# Patient Record
Sex: FEMALE | Race: WHITE | NOT HISPANIC OR LATINO | Employment: OTHER | URBAN - NONMETROPOLITAN AREA
[De-identification: names, ages, dates, MRNs, and addresses within clinical notes are randomized per-mention and may not be internally consistent; named-entity substitution may affect disease eponyms.]

---

## 2017-04-14 ENCOUNTER — DOCTOR'S OFFICE (OUTPATIENT)
Dept: URBAN - NONMETROPOLITAN AREA CLINIC 1 | Facility: CLINIC | Age: 67
Setting detail: OPHTHALMOLOGY
End: 2017-04-14
Payer: COMMERCIAL

## 2017-04-14 DIAGNOSIS — H35.373: ICD-10-CM

## 2017-04-14 DIAGNOSIS — H40.053: ICD-10-CM

## 2017-04-14 DIAGNOSIS — Z96.1: ICD-10-CM

## 2017-04-14 DIAGNOSIS — H35.3131: ICD-10-CM

## 2017-04-14 DIAGNOSIS — H43.813: ICD-10-CM

## 2017-04-14 PROCEDURE — 92014 COMPRE OPH EXAM EST PT 1/>: CPT | Performed by: OPHTHALMOLOGY

## 2017-04-14 PROCEDURE — 92134 CPTRZ OPH DX IMG PST SGM RTA: CPT | Performed by: OPHTHALMOLOGY

## 2017-04-14 ASSESSMENT — REFRACTION_CURRENTRX
OS_OVR_VA: 20/
OS_AXIS: 62
OD_OVR_VA: 20/
OD_SPHERE: +1.00
OD_OVR_VA: 20/
OS_OVR_VA: 20/
OD_AXIS: 68
OS_CYLINDER: -0.75
OD_CYLINDER: -2.00
OS_OVR_VA: 20/
OD_OVR_VA: 20/
OS_SPHERE: +0.25

## 2017-04-14 ASSESSMENT — CONFRONTATIONAL VISUAL FIELD TEST (CVF)
OS_FINDINGS: FULL
OD_FINDINGS: FULL

## 2017-04-14 ASSESSMENT — REFRACTION_MANIFEST
OD_VA2: 20/
OS_VA1: 20/
OD_VA1: 20/
OD_VA1: 20/
OD_VA3: 20/
OS_VA2: 20/
OU_VA: 20/
OD_VA2: 20/
OU_VA: 20/
OD_VA1: 20/
OS_VA2: 20/
OS_VA1: 20/
OS_VA3: 20/
OS_VA2: 20/
OD_VA3: 20/
OS_VA1: 20/
OD_VA2: 20/
OU_VA: 20/
OD_VA3: 20/
OS_VA3: 20/
OS_VA3: 20/

## 2017-04-14 ASSESSMENT — REFRACTION_AUTOREFRACTION
OD_SPHERE: PL
OD_CYLINDER: -0.25
OS_SPHERE: +1.00
OS_AXIS: 097
OD_AXIS: 127
OS_CYLINDER: -1.25

## 2017-04-14 ASSESSMENT — SPHEQUIV_DERIVED: OS_SPHEQUIV: 0.375

## 2017-04-14 ASSESSMENT — VISUAL ACUITY
OS_BCVA: 20/20-1
OD_BCVA: 20/20-2

## 2019-07-18 ENCOUNTER — DOCTOR'S OFFICE (OUTPATIENT)
Dept: URBAN - NONMETROPOLITAN AREA CLINIC 1 | Facility: CLINIC | Age: 69
Setting detail: OPHTHALMOLOGY
End: 2019-07-18
Payer: COMMERCIAL

## 2019-07-18 DIAGNOSIS — H43.813: ICD-10-CM

## 2019-07-18 DIAGNOSIS — Z96.1: ICD-10-CM

## 2019-07-18 DIAGNOSIS — H35.3131: ICD-10-CM

## 2019-07-18 DIAGNOSIS — H35.373: ICD-10-CM

## 2019-07-18 PROCEDURE — 92134 CPTRZ OPH DX IMG PST SGM RTA: CPT | Performed by: OPHTHALMOLOGY

## 2019-07-18 PROCEDURE — 92014 COMPRE OPH EXAM EST PT 1/>: CPT | Performed by: OPHTHALMOLOGY

## 2019-07-18 ASSESSMENT — REFRACTION_CURRENTRX
OS_CYLINDER: -0.75
OD_OVR_VA: 20/
OD_OVR_VA: 20/
OS_AXIS: 62
OS_OVR_VA: 20/
OS_OVR_VA: 20/
OS_SPHERE: +0.25
OD_CYLINDER: -2.00
OD_SPHERE: +1.00
OD_OVR_VA: 20/
OD_AXIS: 68
OS_OVR_VA: 20/

## 2019-07-18 ASSESSMENT — REFRACTION_MANIFEST
OS_VA2: 20/
OD_VA2: 20/
OD_VA2: 20/
OD_VA3: 20/
OU_VA: 20/
OS_VA1: 20/
OS_VA3: 20/
OS_VA2: 20/
OU_VA: 20/
OD_VA1: 20/
OS_VA3: 20/
OD_VA1: 20/
OS_VA1: 20/
OD_VA3: 20/

## 2019-07-18 ASSESSMENT — CONFRONTATIONAL VISUAL FIELD TEST (CVF)
OS_FINDINGS: FULL
OD_FINDINGS: FULL

## 2019-07-18 ASSESSMENT — SPHEQUIV_DERIVED: OS_SPHEQUIV: 0.375

## 2019-07-18 ASSESSMENT — REFRACTION_AUTOREFRACTION
OS_CYLINDER: -1.25
OD_SPHERE: PL
OD_CYLINDER: -0.25
OS_SPHERE: +1.00
OS_AXIS: 097
OD_AXIS: 127

## 2019-07-18 ASSESSMENT — VISUAL ACUITY
OD_BCVA: 20/20-1
OS_BCVA: 20/25

## 2019-08-05 ENCOUNTER — DOCTOR'S OFFICE (OUTPATIENT)
Dept: URBAN - NONMETROPOLITAN AREA CLINIC 1 | Facility: CLINIC | Age: 69
Setting detail: OPHTHALMOLOGY
End: 2019-08-05
Payer: COMMERCIAL

## 2019-08-05 DIAGNOSIS — H35.3131: ICD-10-CM

## 2019-08-05 DIAGNOSIS — H43.813: ICD-10-CM

## 2019-08-05 DIAGNOSIS — H04.123: ICD-10-CM

## 2019-08-05 DIAGNOSIS — H35.373: ICD-10-CM

## 2019-08-05 DIAGNOSIS — H04.122: ICD-10-CM

## 2019-08-05 PROCEDURE — 92250 FUNDUS PHOTOGRAPHY W/I&R: CPT | Performed by: OPHTHALMOLOGY

## 2019-08-05 PROCEDURE — 83861 MICROFLUID ANALY TEARS: CPT | Performed by: OPHTHALMOLOGY

## 2019-08-05 PROCEDURE — 92014 COMPRE OPH EXAM EST PT 1/>: CPT | Performed by: OPHTHALMOLOGY

## 2019-08-05 PROCEDURE — 92235 FLUORESCEIN ANGRPH MLTIFRAME: CPT | Performed by: OPHTHALMOLOGY

## 2019-08-05 ASSESSMENT — REFRACTION_MANIFEST
OD_VA3: 20/
OU_VA: 20/
OS_VA3: 20/
OD_VA2: 20/
OD_VA3: 20/
OS_VA1: 20/
OS_VA2: 20/
OU_VA: 20/
OS_VA2: 20/
OS_VA3: 20/
OD_VA1: 20/
OD_VA2: 20/
OS_VA1: 20/
OD_VA1: 20/

## 2019-08-05 ASSESSMENT — CONFRONTATIONAL VISUAL FIELD TEST (CVF)
OS_FINDINGS: FULL
OD_FINDINGS: FULL

## 2019-08-05 ASSESSMENT — REFRACTION_CURRENTRX
OS_SPHERE: +0.25
OD_CYLINDER: -2.00
OS_CYLINDER: -0.75
OS_OVR_VA: 20/
OS_OVR_VA: 20/
OD_OVR_VA: 20/
OS_AXIS: 62
OD_AXIS: 68
OS_OVR_VA: 20/
OD_OVR_VA: 20/
OD_SPHERE: +1.00
OD_OVR_VA: 20/

## 2019-08-05 ASSESSMENT — REFRACTION_AUTOREFRACTION
OS_CYLINDER: -1.25
OD_SPHERE: PL
OS_AXIS: 097
OD_AXIS: 127
OS_SPHERE: +1.00
OD_CYLINDER: -0.25

## 2019-08-05 ASSESSMENT — VISUAL ACUITY
OS_BCVA: 20/25
OD_BCVA: 20/20-1

## 2019-08-05 ASSESSMENT — SPHEQUIV_DERIVED: OS_SPHEQUIV: 0.375

## 2019-10-04 ENCOUNTER — RX ONLY (RX ONLY)
Age: 69
End: 2019-10-04

## 2019-10-04 ENCOUNTER — DOCTOR'S OFFICE (OUTPATIENT)
Dept: URBAN - NONMETROPOLITAN AREA CLINIC 1 | Facility: CLINIC | Age: 69
Setting detail: OPHTHALMOLOGY
End: 2019-10-04
Payer: COMMERCIAL

## 2019-10-04 DIAGNOSIS — H35.373: ICD-10-CM

## 2019-10-04 DIAGNOSIS — H43.813: ICD-10-CM

## 2019-10-04 DIAGNOSIS — H04.123: ICD-10-CM

## 2019-10-04 DIAGNOSIS — H43.811: ICD-10-CM

## 2019-10-04 DIAGNOSIS — H04.122: ICD-10-CM

## 2019-10-04 DIAGNOSIS — H43.812: ICD-10-CM

## 2019-10-04 DIAGNOSIS — H35.3131: ICD-10-CM

## 2019-10-04 DIAGNOSIS — H40.053: ICD-10-CM

## 2019-10-04 PROCEDURE — 92134 CPTRZ OPH DX IMG PST SGM RTA: CPT | Performed by: OPHTHALMOLOGY

## 2019-10-04 PROCEDURE — 92226 OPHTHALMOSCOPY EXT SUBSEQUENT: CPT | Performed by: OPHTHALMOLOGY

## 2019-10-04 PROCEDURE — 83861 MICROFLUID ANALY TEARS: CPT | Performed by: OPHTHALMOLOGY

## 2019-10-04 PROCEDURE — 92014 COMPRE OPH EXAM EST PT 1/>: CPT | Performed by: OPHTHALMOLOGY

## 2019-10-04 ASSESSMENT — REFRACTION_AUTOREFRACTION
OS_SPHERE: +1.00
OD_AXIS: 127
OS_AXIS: 097
OD_SPHERE: PL
OD_CYLINDER: -0.25
OS_CYLINDER: -1.25

## 2019-10-04 ASSESSMENT — SPHEQUIV_DERIVED: OS_SPHEQUIV: 0.375

## 2019-10-04 ASSESSMENT — REFRACTION_CURRENTRX
OS_AXIS: 62
OS_OVR_VA: 20/
OD_AXIS: 68
OD_OVR_VA: 20/
OD_OVR_VA: 20/
OS_OVR_VA: 20/
OS_SPHERE: +0.25
OD_OVR_VA: 20/
OS_CYLINDER: -0.75
OD_SPHERE: +1.00
OD_CYLINDER: -2.00
OS_OVR_VA: 20/

## 2019-10-04 ASSESSMENT — REFRACTION_MANIFEST
OD_VA1: 20/
OD_VA2: 20/
OD_VA2: 20/
OD_VA3: 20/
OU_VA: 20/
OD_VA1: 20/
OS_VA3: 20/
OS_VA3: 20/
OS_VA1: 20/
OD_VA3: 20/
OU_VA: 20/
OS_VA2: 20/
OS_VA1: 20/
OS_VA2: 20/

## 2019-10-04 ASSESSMENT — VISUAL ACUITY
OS_BCVA: 20/30-2
OD_BCVA: 20/20-2

## 2019-10-04 ASSESSMENT — CONFRONTATIONAL VISUAL FIELD TEST (CVF)
OS_FINDINGS: FULL
OD_FINDINGS: FULL

## 2019-10-10 ENCOUNTER — DOCTOR'S OFFICE (OUTPATIENT)
Dept: URBAN - NONMETROPOLITAN AREA CLINIC 1 | Facility: CLINIC | Age: 69
Setting detail: OPHTHALMOLOGY
End: 2019-10-10
Payer: COMMERCIAL

## 2019-10-10 DIAGNOSIS — H04.121: ICD-10-CM

## 2019-10-10 DIAGNOSIS — H04.123: ICD-10-CM

## 2019-10-10 PROCEDURE — 68761 CLOSE TEAR DUCT OPENING: CPT | Performed by: OPHTHALMOLOGY

## 2019-10-10 ASSESSMENT — REFRACTION_MANIFEST
OD_VA3: 20/
OD_VA3: 20/
OS_VA3: 20/
OS_VA1: 20/
OU_VA: 20/
OS_VA2: 20/
OD_VA2: 20/
OU_VA: 20/
OD_VA2: 20/
OD_VA1: 20/
OS_VA1: 20/
OS_VA2: 20/
OD_VA1: 20/
OS_VA3: 20/

## 2019-10-10 ASSESSMENT — REFRACTION_CURRENTRX
OS_CYLINDER: -0.75
OD_SPHERE: +1.00
OD_OVR_VA: 20/
OS_OVR_VA: 20/
OD_AXIS: 68
OD_CYLINDER: -2.00
OD_OVR_VA: 20/
OS_OVR_VA: 20/
OD_OVR_VA: 20/
OS_SPHERE: +0.25
OS_AXIS: 62
OS_OVR_VA: 20/

## 2019-10-10 ASSESSMENT — VISUAL ACUITY
OD_BCVA: 20/20-2
OS_BCVA: 20/30-2

## 2019-10-10 ASSESSMENT — REFRACTION_AUTOREFRACTION
OS_SPHERE: +1.00
OD_CYLINDER: -0.25
OD_AXIS: 127
OS_CYLINDER: -1.25
OS_AXIS: 097
OD_SPHERE: PL

## 2019-10-10 ASSESSMENT — SPHEQUIV_DERIVED: OS_SPHEQUIV: 0.375

## 2019-11-14 ENCOUNTER — DOCTOR'S OFFICE (OUTPATIENT)
Dept: URBAN - NONMETROPOLITAN AREA CLINIC 1 | Facility: CLINIC | Age: 69
Setting detail: OPHTHALMOLOGY
End: 2019-11-14
Payer: COMMERCIAL

## 2019-11-14 DIAGNOSIS — H40.053: ICD-10-CM

## 2019-11-14 DIAGNOSIS — H04.122: ICD-10-CM

## 2019-11-14 DIAGNOSIS — H04.123: ICD-10-CM

## 2019-11-14 DIAGNOSIS — H04.121: ICD-10-CM

## 2019-11-14 PROCEDURE — 92012 INTRM OPH EXAM EST PATIENT: CPT | Performed by: OPHTHALMOLOGY

## 2019-11-14 PROCEDURE — 83861 MICROFLUID ANALY TEARS: CPT | Performed by: OPHTHALMOLOGY

## 2019-11-14 ASSESSMENT — REFRACTION_AUTOREFRACTION
OS_AXIS: 097
OS_SPHERE: +1.00
OS_CYLINDER: -1.25
OD_AXIS: 127
OD_SPHERE: PL
OD_CYLINDER: -0.25

## 2019-11-14 ASSESSMENT — REFRACTION_MANIFEST
OU_VA: 20/
OU_VA: 20/
OD_VA2: 20/
OS_VA2: 20/
OD_VA3: 20/
OS_VA3: 20/
OD_VA1: 20/
OS_VA1: 20/
OS_VA2: 20/
OD_VA3: 20/
OS_VA1: 20/
OS_VA3: 20/
OD_VA2: 20/
OD_VA1: 20/

## 2019-11-14 ASSESSMENT — REFRACTION_CURRENTRX
OS_SPHERE: +0.25
OD_AXIS: 68
OD_SPHERE: +1.00
OS_AXIS: 62
OS_OVR_VA: 20/
OD_OVR_VA: 20/
OD_CYLINDER: -2.00
OS_CYLINDER: -0.75
OD_OVR_VA: 20/
OS_OVR_VA: 20/
OD_OVR_VA: 20/
OS_OVR_VA: 20/

## 2019-11-14 ASSESSMENT — SPHEQUIV_DERIVED: OS_SPHEQUIV: 0.375

## 2019-11-14 ASSESSMENT — CONFRONTATIONAL VISUAL FIELD TEST (CVF)
OD_FINDINGS: FULL
OS_FINDINGS: FULL

## 2019-11-14 ASSESSMENT — VISUAL ACUITY
OS_BCVA: 20/30-1
OD_BCVA: 20/25+2

## 2019-11-25 ENCOUNTER — DOCTOR'S OFFICE (OUTPATIENT)
Dept: URBAN - NONMETROPOLITAN AREA CLINIC 1 | Facility: CLINIC | Age: 69
Setting detail: OPHTHALMOLOGY
End: 2019-11-25
Payer: COMMERCIAL

## 2019-11-25 DIAGNOSIS — H04.122: ICD-10-CM

## 2019-11-25 DIAGNOSIS — H04.121: ICD-10-CM

## 2019-11-25 PROCEDURE — 68761 CLOSE TEAR DUCT OPENING: CPT | Performed by: OPHTHALMOLOGY

## 2019-11-25 ASSESSMENT — REFRACTION_CURRENTRX
OD_OVR_VA: 20/
OD_OVR_VA: 20/
OS_OVR_VA: 20/
OS_SPHERE: +0.25
OS_OVR_VA: 20/
OD_SPHERE: +1.00
OD_AXIS: 68
OS_OVR_VA: 20/
OS_AXIS: 62
OS_CYLINDER: -0.75
OD_OVR_VA: 20/
OD_CYLINDER: -2.00

## 2019-11-25 ASSESSMENT — REFRACTION_MANIFEST
OS_VA3: 20/
OU_VA: 20/
OS_VA2: 20/
OU_VA: 20/
OD_VA1: 20/
OD_VA1: 20/
OD_VA2: 20/
OS_VA3: 20/
OS_VA1: 20/
OD_VA2: 20/
OD_VA3: 20/
OS_VA1: 20/
OS_VA2: 20/
OD_VA3: 20/

## 2019-11-25 ASSESSMENT — REFRACTION_AUTOREFRACTION
OD_SPHERE: PL
OS_SPHERE: +1.00
OD_AXIS: 127
OS_AXIS: 097
OD_CYLINDER: -0.25
OS_CYLINDER: -1.25

## 2019-11-25 ASSESSMENT — SPHEQUIV_DERIVED: OS_SPHEQUIV: 0.375

## 2019-11-25 ASSESSMENT — VISUAL ACUITY
OD_BCVA: 20/20-1
OS_BCVA: 20/30+2

## 2019-11-25 ASSESSMENT — CONFRONTATIONAL VISUAL FIELD TEST (CVF)
OS_FINDINGS: FULL
OD_FINDINGS: FULL

## 2019-11-25 ASSESSMENT — PUNCTA - ASSESSMENT
OS_PUNCTA: SIL PLUG
OD_PUNCTA: SIL PLUG

## 2019-12-12 ENCOUNTER — DOCTOR'S OFFICE (OUTPATIENT)
Dept: URBAN - NONMETROPOLITAN AREA CLINIC 1 | Facility: CLINIC | Age: 69
Setting detail: OPHTHALMOLOGY
End: 2019-12-12
Payer: COMMERCIAL

## 2019-12-12 DIAGNOSIS — H35.3131: ICD-10-CM

## 2019-12-12 DIAGNOSIS — H04.123: ICD-10-CM

## 2019-12-12 DIAGNOSIS — H35.373: ICD-10-CM

## 2019-12-12 DIAGNOSIS — H43.812: ICD-10-CM

## 2019-12-12 DIAGNOSIS — H43.811: ICD-10-CM

## 2019-12-12 DIAGNOSIS — H43.813: ICD-10-CM

## 2019-12-12 PROCEDURE — 83861 MICROFLUID ANALY TEARS: CPT | Performed by: OPHTHALMOLOGY

## 2019-12-12 PROCEDURE — 92134 CPTRZ OPH DX IMG PST SGM RTA: CPT | Performed by: OPHTHALMOLOGY

## 2019-12-12 PROCEDURE — 92014 COMPRE OPH EXAM EST PT 1/>: CPT | Performed by: OPHTHALMOLOGY

## 2019-12-12 PROCEDURE — 92226 OPHTHALMOSCOPY EXT SUBSEQUENT: CPT | Performed by: OPHTHALMOLOGY

## 2019-12-12 ASSESSMENT — REFRACTION_MANIFEST
OU_VA: 20/
OD_VA3: 20/
OD_VA2: 20/
OS_VA3: 20/
OS_VA1: 20/
OD_VA3: 20/
OD_VA1: 20/
OU_VA: 20/
OD_VA2: 20/
OD_VA1: 20/
OS_VA2: 20/
OS_VA3: 20/
OS_VA1: 20/
OS_VA2: 20/

## 2019-12-12 ASSESSMENT — REFRACTION_AUTOREFRACTION
OD_AXIS: 127
OS_CYLINDER: -1.25
OS_SPHERE: +1.00
OD_SPHERE: PL
OD_CYLINDER: -0.25
OS_AXIS: 097

## 2019-12-12 ASSESSMENT — CONFRONTATIONAL VISUAL FIELD TEST (CVF)
OD_FINDINGS: FULL
OS_FINDINGS: FULL

## 2019-12-12 ASSESSMENT — REFRACTION_CURRENTRX
OD_OVR_VA: 20/
OS_OVR_VA: 20/
OD_OVR_VA: 20/
OD_SPHERE: +1.00
OD_CYLINDER: -2.00
OS_OVR_VA: 20/
OS_OVR_VA: 20/
OD_OVR_VA: 20/
OD_AXIS: 68
OS_SPHERE: +0.25
OS_CYLINDER: -0.75
OS_AXIS: 62

## 2019-12-12 ASSESSMENT — VISUAL ACUITY
OD_BCVA: 20/25-2
OS_BCVA: 20/20-2

## 2019-12-12 ASSESSMENT — PUNCTA - ASSESSMENT
OS_PUNCTA: SIL PLUG
OD_PUNCTA: SIL PLUG

## 2019-12-12 ASSESSMENT — SPHEQUIV_DERIVED: OS_SPHEQUIV: 0.375

## 2019-12-19 ENCOUNTER — DOCTOR'S OFFICE (OUTPATIENT)
Dept: URBAN - NONMETROPOLITAN AREA CLINIC 1 | Facility: CLINIC | Age: 69
Setting detail: OPHTHALMOLOGY
End: 2019-12-19
Payer: COMMERCIAL

## 2019-12-19 DIAGNOSIS — H04.123: ICD-10-CM

## 2019-12-19 PROCEDURE — 68761 CLOSE TEAR DUCT OPENING: CPT | Performed by: OPHTHALMOLOGY

## 2019-12-19 ASSESSMENT — REFRACTION_MANIFEST
OD_VA1: 20/
OU_VA: 20/
OS_VA3: 20/
OD_VA1: 20/
OU_VA: 20/
OD_VA3: 20/
OS_VA2: 20/
OD_VA2: 20/
OS_VA1: 20/
OS_VA3: 20/
OS_VA1: 20/
OD_VA2: 20/
OD_VA3: 20/
OS_VA2: 20/

## 2019-12-23 ASSESSMENT — REFRACTION_CURRENTRX
OD_OVR_VA: 20/
OS_AXIS: 62
OS_CYLINDER: -0.75
OS_OVR_VA: 20/
OS_OVR_VA: 20/
OS_SPHERE: +0.25
OS_OVR_VA: 20/
OD_OVR_VA: 20/
OD_OVR_VA: 20/
OD_SPHERE: +1.00
OD_AXIS: 68
OD_CYLINDER: -2.00

## 2019-12-23 ASSESSMENT — REFRACTION_AUTOREFRACTION
OD_AXIS: 127
OS_SPHERE: +1.00
OD_CYLINDER: -0.25
OS_AXIS: 097
OS_CYLINDER: -1.25
OD_SPHERE: PL

## 2019-12-23 ASSESSMENT — VISUAL ACUITY
OS_BCVA: 20/20-2
OD_BCVA: 20/25-2

## 2019-12-23 ASSESSMENT — SPHEQUIV_DERIVED: OS_SPHEQUIV: 0.375

## 2020-01-20 ENCOUNTER — DOCTOR'S OFFICE (OUTPATIENT)
Dept: URBAN - NONMETROPOLITAN AREA CLINIC 1 | Facility: CLINIC | Age: 70
Setting detail: OPHTHALMOLOGY
End: 2020-01-20
Payer: COMMERCIAL

## 2020-01-20 DIAGNOSIS — H35.373: ICD-10-CM

## 2020-01-20 DIAGNOSIS — H35.3131: ICD-10-CM

## 2020-01-20 DIAGNOSIS — H43.813: ICD-10-CM

## 2020-01-20 DIAGNOSIS — H04.123: ICD-10-CM

## 2020-01-20 PROCEDURE — 83861 MICROFLUID ANALY TEARS: CPT | Performed by: OPHTHALMOLOGY

## 2020-01-20 PROCEDURE — 92014 COMPRE OPH EXAM EST PT 1/>: CPT | Performed by: OPHTHALMOLOGY

## 2020-01-20 PROCEDURE — 92134 CPTRZ OPH DX IMG PST SGM RTA: CPT | Performed by: OPHTHALMOLOGY

## 2020-01-20 PROCEDURE — 92202 OPSCPY EXTND ON/MAC DRAW: CPT | Performed by: OPHTHALMOLOGY

## 2020-01-20 ASSESSMENT — REFRACTION_CURRENTRX
OD_CYLINDER: -2.00
OS_AXIS: 62
OD_AXIS: 68
OD_SPHERE: +1.00
OS_SPHERE: +0.25
OD_OVR_VA: 20/
OS_OVR_VA: 20/
OS_CYLINDER: -0.75

## 2020-01-20 ASSESSMENT — REFRACTION_AUTOREFRACTION
OS_SPHERE: +1.00
OD_SPHERE: PL
OS_CYLINDER: -1.25
OD_AXIS: 127
OD_CYLINDER: -0.25
OS_AXIS: 097

## 2020-01-20 ASSESSMENT — PUNCTA - ASSESSMENT
OS_PUNCTA: SIL PLUG
OD_PUNCTA: SIL PLUG

## 2020-01-20 ASSESSMENT — CONFRONTATIONAL VISUAL FIELD TEST (CVF)
OD_FINDINGS: FULL
OS_FINDINGS: FULL

## 2020-01-20 ASSESSMENT — SPHEQUIV_DERIVED: OS_SPHEQUIV: 0.375

## 2020-01-20 ASSESSMENT — VISUAL ACUITY
OS_BCVA: 20/20-2
OD_BCVA: 20/20-1

## 2020-06-08 ENCOUNTER — DOCTOR'S OFFICE (OUTPATIENT)
Dept: URBAN - NONMETROPOLITAN AREA CLINIC 1 | Facility: CLINIC | Age: 70
Setting detail: OPHTHALMOLOGY
End: 2020-06-08
Payer: COMMERCIAL

## 2020-06-08 DIAGNOSIS — H43.813: ICD-10-CM

## 2020-06-08 DIAGNOSIS — H35.373: ICD-10-CM

## 2020-06-08 DIAGNOSIS — H04.123: ICD-10-CM

## 2020-06-08 DIAGNOSIS — H40.053: ICD-10-CM

## 2020-06-08 DIAGNOSIS — H35.3131: ICD-10-CM

## 2020-06-08 PROCEDURE — 92201 OPSCPY EXTND RTA DRAW UNI/BI: CPT | Performed by: OPHTHALMOLOGY

## 2020-06-08 PROCEDURE — 92134 CPTRZ OPH DX IMG PST SGM RTA: CPT | Performed by: OPHTHALMOLOGY

## 2020-06-08 PROCEDURE — 83861 MICROFLUID ANALY TEARS: CPT | Performed by: OPHTHALMOLOGY

## 2020-06-08 PROCEDURE — 92014 COMPRE OPH EXAM EST PT 1/>: CPT | Performed by: OPHTHALMOLOGY

## 2020-06-08 ASSESSMENT — CONFRONTATIONAL VISUAL FIELD TEST (CVF)
OD_FINDINGS: FULL
OS_FINDINGS: FULL

## 2020-06-08 ASSESSMENT — REFRACTION_CURRENTRX
OS_OVR_VA: 20/
OD_OVR_VA: 20/
OD_AXIS: 68
OD_SPHERE: +1.00
OS_CYLINDER: -0.75
OD_CYLINDER: -2.00
OS_SPHERE: +0.25
OS_AXIS: 62

## 2020-06-08 ASSESSMENT — SPHEQUIV_DERIVED: OS_SPHEQUIV: 0.375

## 2020-06-08 ASSESSMENT — PUNCTA - ASSESSMENT
OS_PUNCTA: SIL PLUG
OD_PUNCTA: SIL PLUG

## 2020-06-08 ASSESSMENT — REFRACTION_AUTOREFRACTION
OD_CYLINDER: -0.25
OS_CYLINDER: -1.25
OS_SPHERE: +1.00
OS_AXIS: 097
OD_SPHERE: PL
OD_AXIS: 127

## 2020-06-08 ASSESSMENT — VISUAL ACUITY
OS_BCVA: 20/30
OD_BCVA: 20/25-1

## 2020-06-25 ENCOUNTER — DOCTOR'S OFFICE (OUTPATIENT)
Dept: URBAN - NONMETROPOLITAN AREA CLINIC 1 | Facility: CLINIC | Age: 70
Setting detail: OPHTHALMOLOGY
End: 2020-06-25
Payer: COMMERCIAL

## 2020-06-25 DIAGNOSIS — H04.123: ICD-10-CM

## 2020-06-25 PROCEDURE — 68761 CLOSE TEAR DUCT OPENING: CPT | Performed by: OPHTHALMOLOGY

## 2020-06-25 ASSESSMENT — REFRACTION_AUTOREFRACTION
OS_CYLINDER: -1.25
OS_AXIS: 097
OD_CYLINDER: -0.25
OS_SPHERE: +1.00
OD_SPHERE: PL
OD_AXIS: 127

## 2020-06-25 ASSESSMENT — VISUAL ACUITY
OD_BCVA: 20/25-1
OS_BCVA: 20/30

## 2020-06-25 ASSESSMENT — REFRACTION_CURRENTRX
OS_SPHERE: +0.25
OS_OVR_VA: 20/
OS_AXIS: 62
OS_CYLINDER: -0.75
OD_SPHERE: +1.00
OD_AXIS: 68
OD_CYLINDER: -2.00
OD_OVR_VA: 20/

## 2020-06-25 ASSESSMENT — SPHEQUIV_DERIVED: OS_SPHEQUIV: 0.375

## 2020-07-23 ENCOUNTER — DOCTOR'S OFFICE (OUTPATIENT)
Dept: URBAN - NONMETROPOLITAN AREA CLINIC 1 | Facility: CLINIC | Age: 70
Setting detail: OPHTHALMOLOGY
End: 2020-07-23
Payer: COMMERCIAL

## 2020-07-23 DIAGNOSIS — H35.373: ICD-10-CM

## 2020-07-23 DIAGNOSIS — H43.813: ICD-10-CM

## 2020-07-23 DIAGNOSIS — H04.123: ICD-10-CM

## 2020-07-23 DIAGNOSIS — H35.3131: ICD-10-CM

## 2020-07-23 DIAGNOSIS — H40.053: ICD-10-CM

## 2020-07-23 PROCEDURE — 92134 CPTRZ OPH DX IMG PST SGM RTA: CPT | Performed by: OPHTHALMOLOGY

## 2020-07-23 PROCEDURE — 92014 COMPRE OPH EXAM EST PT 1/>: CPT | Performed by: OPHTHALMOLOGY

## 2020-07-23 PROCEDURE — 83861 MICROFLUID ANALY TEARS: CPT | Performed by: OPHTHALMOLOGY

## 2020-07-23 ASSESSMENT — REFRACTION_CURRENTRX
OD_OVR_VA: 20/
OD_AXIS: 68
OS_SPHERE: +0.25
OS_OVR_VA: 20/
OD_CYLINDER: -2.00
OS_AXIS: 62
OS_CYLINDER: -0.75
OD_SPHERE: +1.00

## 2020-07-23 ASSESSMENT — PUNCTA - ASSESSMENT
OS_PUNCTA: SIL PLUG
OD_PUNCTA: SIL PLUG

## 2020-07-23 ASSESSMENT — REFRACTION_AUTOREFRACTION
OD_AXIS: 127
OD_CYLINDER: -0.25
OS_SPHERE: +1.00
OD_SPHERE: PL
OS_CYLINDER: -1.25
OS_AXIS: 097

## 2020-07-23 ASSESSMENT — VISUAL ACUITY
OS_BCVA: 20/25-2
OD_BCVA: 20/25-2

## 2020-07-23 ASSESSMENT — CONFRONTATIONAL VISUAL FIELD TEST (CVF)
OD_FINDINGS: FULL
OS_FINDINGS: FULL

## 2020-07-23 ASSESSMENT — SPHEQUIV_DERIVED: OS_SPHEQUIV: 0.375

## 2020-10-01 ENCOUNTER — DOCTOR'S OFFICE (OUTPATIENT)
Dept: URBAN - NONMETROPOLITAN AREA CLINIC 1 | Facility: CLINIC | Age: 70
Setting detail: OPHTHALMOLOGY
End: 2020-10-01
Payer: COMMERCIAL

## 2020-10-01 DIAGNOSIS — H35.373: ICD-10-CM

## 2020-10-01 DIAGNOSIS — H35.3131: ICD-10-CM

## 2020-10-01 DIAGNOSIS — H43.813: ICD-10-CM

## 2020-10-01 DIAGNOSIS — H40.053: ICD-10-CM

## 2020-10-01 DIAGNOSIS — H04.123: ICD-10-CM

## 2020-10-01 PROCEDURE — 83861 MICROFLUID ANALY TEARS: CPT | Performed by: OPHTHALMOLOGY

## 2020-10-01 PROCEDURE — 92134 CPTRZ OPH DX IMG PST SGM RTA: CPT | Performed by: OPHTHALMOLOGY

## 2020-10-01 PROCEDURE — 92014 COMPRE OPH EXAM EST PT 1/>: CPT | Performed by: OPHTHALMOLOGY

## 2020-10-01 PROCEDURE — 92201 OPSCPY EXTND RTA DRAW UNI/BI: CPT | Performed by: OPHTHALMOLOGY

## 2020-10-01 ASSESSMENT — REFRACTION_CURRENTRX
OS_CYLINDER: -0.75
OS_SPHERE: +0.25
OS_AXIS: 62
OD_AXIS: 68
OS_OVR_VA: 20/
OD_SPHERE: +1.00
OD_OVR_VA: 20/
OD_CYLINDER: -2.00

## 2020-10-01 ASSESSMENT — REFRACTION_AUTOREFRACTION
OS_SPHERE: +1.00
OD_AXIS: 127
OS_AXIS: 097
OD_CYLINDER: -0.25
OS_CYLINDER: -1.25
OD_SPHERE: PL

## 2020-10-01 ASSESSMENT — SPHEQUIV_DERIVED: OS_SPHEQUIV: 0.375

## 2020-10-01 ASSESSMENT — PUNCTA - ASSESSMENT
OS_PUNCTA: SIL PLUG
OD_PUNCTA: SIL PLUG

## 2020-10-01 ASSESSMENT — VISUAL ACUITY
OD_BCVA: 20/20-1
OS_BCVA: 20/20-2

## 2020-10-01 ASSESSMENT — CONFRONTATIONAL VISUAL FIELD TEST (CVF)
OD_FINDINGS: FULL
OS_FINDINGS: FULL

## 2020-11-05 ENCOUNTER — DOCTOR'S OFFICE (OUTPATIENT)
Dept: URBAN - NONMETROPOLITAN AREA CLINIC 1 | Facility: CLINIC | Age: 70
Setting detail: OPHTHALMOLOGY
End: 2020-11-05
Payer: COMMERCIAL

## 2020-11-05 DIAGNOSIS — H43.813: ICD-10-CM

## 2020-11-05 DIAGNOSIS — H40.053: ICD-10-CM

## 2020-11-05 DIAGNOSIS — H35.373: ICD-10-CM

## 2020-11-05 DIAGNOSIS — H04.123: ICD-10-CM

## 2020-11-05 DIAGNOSIS — H35.3131: ICD-10-CM

## 2020-11-05 PROCEDURE — 83861 MICROFLUID ANALY TEARS: CPT | Performed by: OPHTHALMOLOGY

## 2020-11-05 PROCEDURE — 92134 CPTRZ OPH DX IMG PST SGM RTA: CPT | Performed by: OPHTHALMOLOGY

## 2020-11-05 PROCEDURE — 92201 OPSCPY EXTND RTA DRAW UNI/BI: CPT | Performed by: OPHTHALMOLOGY

## 2020-11-05 PROCEDURE — 92014 COMPRE OPH EXAM EST PT 1/>: CPT | Performed by: OPHTHALMOLOGY

## 2020-11-05 ASSESSMENT — REFRACTION_AUTOREFRACTION
OD_AXIS: 127
OS_SPHERE: +1.00
OD_CYLINDER: -0.25
OS_AXIS: 097
OS_CYLINDER: -1.25
OD_SPHERE: PL

## 2020-11-05 ASSESSMENT — REFRACTION_CURRENTRX
OS_CYLINDER: -0.75
OD_AXIS: 68
OD_CYLINDER: -2.00
OS_OVR_VA: 20/
OS_SPHERE: +0.25
OS_AXIS: 62
OD_SPHERE: +1.00
OD_OVR_VA: 20/

## 2020-11-05 ASSESSMENT — VISUAL ACUITY
OD_BCVA: 20/30-1
OS_BCVA: 20/40-1

## 2020-11-05 ASSESSMENT — SPHEQUIV_DERIVED: OS_SPHEQUIV: 0.375

## 2020-11-05 ASSESSMENT — CONFRONTATIONAL VISUAL FIELD TEST (CVF)
OS_FINDINGS: FULL
OD_FINDINGS: FULL

## 2020-11-05 ASSESSMENT — PUNCTA - ASSESSMENT
OD_PUNCTA: SIL PLUG
OS_PUNCTA: SIL PLUG

## 2021-01-05 DIAGNOSIS — R29.898 OTHER SYMPTOMS AND SIGNS INVOLVING THE MUSCULOSKELETAL SYSTEM: ICD-10-CM

## 2021-01-05 DIAGNOSIS — R20.9 UNSPECIFIED DISTURBANCES OF SKIN SENSATION: ICD-10-CM

## 2021-01-05 DIAGNOSIS — R92.8 OTHER ABNORMAL AND INCONCLUSIVE FINDINGS ON DIAGNOSTIC IMAGING OF BREAST: ICD-10-CM

## 2021-01-05 DIAGNOSIS — S99.911A UNSPECIFIED INJURY OF RIGHT ANKLE, INITIAL ENCOUNTER: ICD-10-CM

## 2021-01-05 DIAGNOSIS — Z12.31 ENCOUNTER FOR SCREENING MAMMOGRAM FOR MALIGNANT NEOPLASM OF BREAST: ICD-10-CM

## 2021-01-11 ENCOUNTER — HOSPITAL ENCOUNTER (OUTPATIENT)
Dept: RADIOLOGY | Facility: CLINIC | Age: 71
Discharge: HOME | End: 2021-01-11
Payer: COMMERCIAL

## 2021-01-11 VITALS — HEIGHT: 64 IN | BODY MASS INDEX: 29.37 KG/M2 | WEIGHT: 172 LBS

## 2021-01-11 DIAGNOSIS — Z12.31 ENCOUNTER FOR SCREENING MAMMOGRAM FOR MALIGNANT NEOPLASM OF BREAST: ICD-10-CM

## 2021-01-11 PROCEDURE — 77067 SCR MAMMO BI INCL CAD: CPT

## 2021-01-11 PROCEDURE — 77063 BREAST TOMOSYNTHESIS BI: CPT

## 2021-01-14 ENCOUNTER — DOCTOR'S OFFICE (OUTPATIENT)
Dept: URBAN - NONMETROPOLITAN AREA CLINIC 1 | Facility: CLINIC | Age: 71
Setting detail: OPHTHALMOLOGY
End: 2021-01-14
Payer: COMMERCIAL

## 2021-01-14 DIAGNOSIS — H35.3131: ICD-10-CM

## 2021-01-14 DIAGNOSIS — H40.053: ICD-10-CM

## 2021-01-14 DIAGNOSIS — H35.373: ICD-10-CM

## 2021-01-14 DIAGNOSIS — H43.813: ICD-10-CM

## 2021-01-14 DIAGNOSIS — H04.123: ICD-10-CM

## 2021-01-14 PROCEDURE — 92134 CPTRZ OPH DX IMG PST SGM RTA: CPT | Performed by: OPHTHALMOLOGY

## 2021-01-14 PROCEDURE — 92201 OPSCPY EXTND RTA DRAW UNI/BI: CPT | Performed by: OPHTHALMOLOGY

## 2021-01-14 PROCEDURE — 83861 MICROFLUID ANALY TEARS: CPT | Performed by: OPHTHALMOLOGY

## 2021-01-14 PROCEDURE — 92014 COMPRE OPH EXAM EST PT 1/>: CPT | Performed by: OPHTHALMOLOGY

## 2021-01-14 ASSESSMENT — REFRACTION_AUTOREFRACTION
OS_CYLINDER: -1.25
OD_AXIS: 127
OD_CYLINDER: -0.25
OD_SPHERE: PL
OS_AXIS: 097
OS_SPHERE: +1.00

## 2021-01-14 ASSESSMENT — REFRACTION_CURRENTRX
OS_CYLINDER: -0.75
OD_CYLINDER: -2.00
OS_SPHERE: +0.25
OS_OVR_VA: 20/
OD_OVR_VA: 20/
OD_AXIS: 68
OS_AXIS: 62
OD_SPHERE: +1.00

## 2021-01-14 ASSESSMENT — CONFRONTATIONAL VISUAL FIELD TEST (CVF)
OD_FINDINGS: FULL
OS_FINDINGS: FULL

## 2021-01-14 ASSESSMENT — PUNCTA - ASSESSMENT
OS_PUNCTA: SIL PLUG
OD_PUNCTA: SIL PLUG

## 2021-01-14 ASSESSMENT — VISUAL ACUITY
OS_BCVA: 20/40-1
OD_BCVA: 20/25-1

## 2021-01-14 ASSESSMENT — SPHEQUIV_DERIVED: OS_SPHEQUIV: 0.375

## 2021-01-28 ENCOUNTER — DOCTOR'S OFFICE (OUTPATIENT)
Dept: URBAN - NONMETROPOLITAN AREA CLINIC 1 | Facility: CLINIC | Age: 71
Setting detail: OPHTHALMOLOGY
End: 2021-01-28
Payer: COMMERCIAL

## 2021-01-28 DIAGNOSIS — H04.123: ICD-10-CM

## 2021-01-28 PROCEDURE — 68761 CLOSE TEAR DUCT OPENING: CPT | Performed by: OPHTHALMOLOGY

## 2021-01-28 ASSESSMENT — REFRACTION_CURRENTRX
OD_CYLINDER: -2.00
OD_OVR_VA: 20/
OS_SPHERE: +0.25
OD_SPHERE: +1.00
OS_OVR_VA: 20/
OS_AXIS: 62
OS_CYLINDER: -0.75
OD_AXIS: 68

## 2021-01-28 ASSESSMENT — REFRACTION_AUTOREFRACTION
OS_AXIS: 097
OD_AXIS: 127
OD_CYLINDER: -0.25
OS_CYLINDER: -1.25
OD_SPHERE: PL
OS_SPHERE: +1.00

## 2021-01-28 ASSESSMENT — VISUAL ACUITY
OD_BCVA: 20/25-1
OS_BCVA: 20/40-1

## 2021-01-28 ASSESSMENT — SPHEQUIV_DERIVED: OS_SPHEQUIV: 0.375

## 2021-03-10 DIAGNOSIS — Z23 ENCOUNTER FOR IMMUNIZATION: ICD-10-CM

## 2021-03-19 ENCOUNTER — IMMUNIZATIONS (OUTPATIENT)
Dept: FAMILY MEDICINE CLINIC | Facility: HOSPITAL | Age: 71
End: 2021-03-19

## 2021-03-19 DIAGNOSIS — Z23 ENCOUNTER FOR IMMUNIZATION: Primary | ICD-10-CM

## 2021-03-19 PROCEDURE — 0001A SARS-COV-2 / COVID-19 MRNA VACCINE (PFIZER-BIONTECH) 30 MCG: CPT

## 2021-03-19 PROCEDURE — 91300 SARS-COV-2 / COVID-19 MRNA VACCINE (PFIZER-BIONTECH) 30 MCG: CPT

## 2021-04-01 ENCOUNTER — DOCTOR'S OFFICE (OUTPATIENT)
Dept: URBAN - NONMETROPOLITAN AREA CLINIC 1 | Facility: CLINIC | Age: 71
Setting detail: OPHTHALMOLOGY
End: 2021-04-01
Payer: COMMERCIAL

## 2021-04-01 DIAGNOSIS — H35.3131: ICD-10-CM

## 2021-04-01 DIAGNOSIS — H35.373: ICD-10-CM

## 2021-04-01 DIAGNOSIS — H43.813: ICD-10-CM

## 2021-04-01 DIAGNOSIS — H40.053: ICD-10-CM

## 2021-04-01 DIAGNOSIS — H04.123: ICD-10-CM

## 2021-04-01 PROCEDURE — 92134 CPTRZ OPH DX IMG PST SGM RTA: CPT | Performed by: OPHTHALMOLOGY

## 2021-04-01 PROCEDURE — 92014 COMPRE OPH EXAM EST PT 1/>: CPT | Performed by: OPHTHALMOLOGY

## 2021-04-01 PROCEDURE — 83861 MICROFLUID ANALY TEARS: CPT | Performed by: OPHTHALMOLOGY

## 2021-04-01 PROCEDURE — 92201 OPSCPY EXTND RTA DRAW UNI/BI: CPT | Performed by: OPHTHALMOLOGY

## 2021-04-01 ASSESSMENT — REFRACTION_CURRENTRX
OD_CYLINDER: -2.00
OS_AXIS: 62
OD_OVR_VA: 20/
OS_CYLINDER: -0.75
OS_OVR_VA: 20/
OS_SPHERE: +0.25
OD_AXIS: 68
OD_SPHERE: +1.00

## 2021-04-01 ASSESSMENT — REFRACTION_AUTOREFRACTION
OD_CYLINDER: -0.25
OD_SPHERE: PL
OS_AXIS: 097
OS_CYLINDER: -1.25
OS_SPHERE: +1.00
OD_AXIS: 127

## 2021-04-01 ASSESSMENT — SPHEQUIV_DERIVED: OS_SPHEQUIV: 0.375

## 2021-04-01 ASSESSMENT — CONFRONTATIONAL VISUAL FIELD TEST (CVF)
OD_FINDINGS: FULL
OS_FINDINGS: FULL

## 2021-04-01 ASSESSMENT — VISUAL ACUITY
OD_BCVA: 20/25-1
OS_BCVA: 20/25-2

## 2021-04-12 ENCOUNTER — IMMUNIZATIONS (OUTPATIENT)
Dept: FAMILY MEDICINE CLINIC | Facility: HOSPITAL | Age: 71
End: 2021-04-12

## 2021-04-12 DIAGNOSIS — Z23 ENCOUNTER FOR IMMUNIZATION: Primary | ICD-10-CM

## 2021-04-12 PROCEDURE — 91300 SARS-COV-2 / COVID-19 MRNA VACCINE (PFIZER-BIONTECH) 30 MCG: CPT

## 2021-04-12 PROCEDURE — 0002A SARS-COV-2 / COVID-19 MRNA VACCINE (PFIZER-BIONTECH) 30 MCG: CPT

## 2021-04-16 ENCOUNTER — DOCTOR'S OFFICE (OUTPATIENT)
Dept: URBAN - NONMETROPOLITAN AREA CLINIC 1 | Facility: CLINIC | Age: 71
Setting detail: OPHTHALMOLOGY
End: 2021-04-16
Payer: COMMERCIAL

## 2021-04-16 DIAGNOSIS — H04.123: ICD-10-CM

## 2021-04-16 PROCEDURE — 68761 CLOSE TEAR DUCT OPENING: CPT | Performed by: OPHTHALMOLOGY

## 2021-04-16 ASSESSMENT — REFRACTION_CURRENTRX
OS_CYLINDER: -0.75
OS_OVR_VA: 20/
OS_AXIS: 62
OD_CYLINDER: -2.00
OD_SPHERE: +1.00
OD_OVR_VA: 20/
OD_AXIS: 68
OS_SPHERE: +0.25

## 2021-04-16 ASSESSMENT — REFRACTION_AUTOREFRACTION
OD_SPHERE: PL
OS_AXIS: 097
OS_CYLINDER: -1.25
OD_AXIS: 127
OS_SPHERE: +1.00
OD_CYLINDER: -0.25

## 2021-04-16 ASSESSMENT — SPHEQUIV_DERIVED: OS_SPHEQUIV: 0.375

## 2021-04-16 ASSESSMENT — VISUAL ACUITY
OD_BCVA: 20/25-1
OS_BCVA: 20/25-2

## 2021-05-06 ENCOUNTER — DOCTOR'S OFFICE (OUTPATIENT)
Dept: URBAN - NONMETROPOLITAN AREA CLINIC 1 | Facility: CLINIC | Age: 71
Setting detail: OPHTHALMOLOGY
End: 2021-05-06
Payer: COMMERCIAL

## 2021-05-06 DIAGNOSIS — H04.123: ICD-10-CM

## 2021-05-06 DIAGNOSIS — H43.813: ICD-10-CM

## 2021-05-06 DIAGNOSIS — H35.3131: ICD-10-CM

## 2021-05-06 DIAGNOSIS — H40.053: ICD-10-CM

## 2021-05-06 DIAGNOSIS — H04.122: ICD-10-CM

## 2021-05-06 DIAGNOSIS — H04.121: ICD-10-CM

## 2021-05-06 DIAGNOSIS — H35.373: ICD-10-CM

## 2021-05-06 PROCEDURE — 83861 MICROFLUID ANALY TEARS: CPT | Performed by: OPHTHALMOLOGY

## 2021-05-06 PROCEDURE — 92201 OPSCPY EXTND RTA DRAW UNI/BI: CPT | Performed by: OPHTHALMOLOGY

## 2021-05-06 PROCEDURE — 92014 COMPRE OPH EXAM EST PT 1/>: CPT | Performed by: OPHTHALMOLOGY

## 2021-05-06 PROCEDURE — 92134 CPTRZ OPH DX IMG PST SGM RTA: CPT | Performed by: OPHTHALMOLOGY

## 2021-05-06 ASSESSMENT — CONFRONTATIONAL VISUAL FIELD TEST (CVF)
OD_FINDINGS: FULL
OS_FINDINGS: FULL

## 2021-05-06 ASSESSMENT — REFRACTION_AUTOREFRACTION
OD_SPHERE: PL
OS_AXIS: 097
OD_CYLINDER: -0.25
OS_SPHERE: +1.00
OD_AXIS: 127
OS_CYLINDER: -1.25

## 2021-05-06 ASSESSMENT — VISUAL ACUITY
OS_BCVA: 20/30-2
OD_BCVA: 20/30-1

## 2021-05-06 ASSESSMENT — REFRACTION_CURRENTRX
OD_AXIS: 68
OD_OVR_VA: 20/
OS_CYLINDER: -0.75
OS_OVR_VA: 20/
OD_SPHERE: +1.00
OD_CYLINDER: -2.00
OS_AXIS: 62
OS_SPHERE: +0.25

## 2021-05-06 ASSESSMENT — SPHEQUIV_DERIVED: OS_SPHEQUIV: 0.375

## 2021-05-20 ENCOUNTER — DOCTOR'S OFFICE (OUTPATIENT)
Dept: URBAN - NONMETROPOLITAN AREA CLINIC 1 | Facility: CLINIC | Age: 71
Setting detail: OPHTHALMOLOGY
End: 2021-05-20
Payer: COMMERCIAL

## 2021-05-20 DIAGNOSIS — H04.123: ICD-10-CM

## 2021-05-20 PROCEDURE — 68761 CLOSE TEAR DUCT OPENING: CPT | Performed by: OPHTHALMOLOGY

## 2021-05-20 ASSESSMENT — VISUAL ACUITY
OD_BCVA: 20/30-1
OS_BCVA: 20/30-2

## 2021-05-20 ASSESSMENT — REFRACTION_AUTOREFRACTION
OS_SPHERE: +1.00
OD_CYLINDER: -0.25
OS_AXIS: 097
OD_AXIS: 127
OD_SPHERE: PL
OS_CYLINDER: -1.25

## 2021-05-20 ASSESSMENT — SPHEQUIV_DERIVED: OS_SPHEQUIV: 0.375

## 2021-05-25 ENCOUNTER — HOSPITAL ENCOUNTER (OUTPATIENT)
Dept: RADIOLOGY | Facility: HOSPITAL | Age: 71
Discharge: HOME | End: 2021-05-25
Payer: COMMERCIAL

## 2021-05-25 ENCOUNTER — TRANSCRIBE ORDERS (OUTPATIENT)
Dept: ADMINISTRATIVE | Facility: HOSPITAL | Age: 71
End: 2021-05-25

## 2021-05-25 DIAGNOSIS — M54.40 LOW BACK PAIN WITH SCIATICA, SCIATICA LATERALITY UNSPECIFIED, UNSPECIFIED BACK PAIN LATERALITY, UNSPECIFIED CHRONICITY: ICD-10-CM

## 2021-05-25 DIAGNOSIS — M25.559 HIP PAIN: ICD-10-CM

## 2021-05-25 DIAGNOSIS — M54.40 LOW BACK PAIN WITH SCIATICA, SCIATICA LATERALITY UNSPECIFIED, UNSPECIFIED BACK PAIN LATERALITY, UNSPECIFIED CHRONICITY: Primary | ICD-10-CM

## 2021-05-25 PROCEDURE — 73522 X-RAY EXAM HIPS BI 3-4 VIEWS: CPT

## 2021-05-25 PROCEDURE — 72110 X-RAY EXAM L-2 SPINE 4/>VWS: CPT

## 2021-06-03 ENCOUNTER — DOCTOR'S OFFICE (OUTPATIENT)
Dept: URBAN - NONMETROPOLITAN AREA CLINIC 1 | Facility: CLINIC | Age: 71
Setting detail: OPHTHALMOLOGY
End: 2021-06-03
Payer: COMMERCIAL

## 2021-06-03 DIAGNOSIS — H35.373: ICD-10-CM

## 2021-06-03 DIAGNOSIS — H43.813: ICD-10-CM

## 2021-06-03 DIAGNOSIS — H04.122: ICD-10-CM

## 2021-06-03 DIAGNOSIS — H35.3131: ICD-10-CM

## 2021-06-03 DIAGNOSIS — H40.053: ICD-10-CM

## 2021-06-03 DIAGNOSIS — H04.123: ICD-10-CM

## 2021-06-03 DIAGNOSIS — H04.121: ICD-10-CM

## 2021-06-03 PROCEDURE — 83861 MICROFLUID ANALY TEARS: CPT | Performed by: OPHTHALMOLOGY

## 2021-06-03 PROCEDURE — 92201 OPSCPY EXTND RTA DRAW UNI/BI: CPT | Performed by: OPHTHALMOLOGY

## 2021-06-03 PROCEDURE — 92134 CPTRZ OPH DX IMG PST SGM RTA: CPT | Performed by: OPHTHALMOLOGY

## 2021-06-03 PROCEDURE — 92014 COMPRE OPH EXAM EST PT 1/>: CPT | Performed by: OPHTHALMOLOGY

## 2021-06-03 ASSESSMENT — CONFRONTATIONAL VISUAL FIELD TEST (CVF)
OD_FINDINGS: FULL
OS_FINDINGS: FULL

## 2021-06-03 ASSESSMENT — TEAR BREAK UP TIME (TBUT)
OD_TBUT: T
OS_TBUT: T

## 2021-06-03 ASSESSMENT — REFRACTION_AUTOREFRACTION
OD_SPHERE: PL
OS_SPHERE: +1.00
OD_CYLINDER: -0.25
OS_CYLINDER: -1.25
OS_AXIS: 097
OD_AXIS: 127

## 2021-06-03 ASSESSMENT — SPHEQUIV_DERIVED: OS_SPHEQUIV: 0.375

## 2021-06-03 ASSESSMENT — VISUAL ACUITY
OS_BCVA: 20/40-1
OD_BCVA: 20/40+2

## 2021-08-12 ENCOUNTER — DOCTOR'S OFFICE (OUTPATIENT)
Dept: URBAN - NONMETROPOLITAN AREA CLINIC 1 | Facility: CLINIC | Age: 71
Setting detail: OPHTHALMOLOGY
End: 2021-08-12
Payer: COMMERCIAL

## 2021-08-12 DIAGNOSIS — H43.813: ICD-10-CM

## 2021-08-12 DIAGNOSIS — H35.3131: ICD-10-CM

## 2021-08-12 DIAGNOSIS — H40.053: ICD-10-CM

## 2021-08-12 DIAGNOSIS — H35.373: ICD-10-CM

## 2021-08-12 DIAGNOSIS — H04.123: ICD-10-CM

## 2021-08-12 PROBLEM — Z96.1 PSEUDOPHAKIA ; BOTH EYES: Status: ACTIVE | Noted: 2017-04-14

## 2021-08-12 PROCEDURE — 92201 OPSCPY EXTND RTA DRAW UNI/BI: CPT | Performed by: OPHTHALMOLOGY

## 2021-08-12 PROCEDURE — 92014 COMPRE OPH EXAM EST PT 1/>: CPT | Performed by: OPHTHALMOLOGY

## 2021-08-12 PROCEDURE — 92134 CPTRZ OPH DX IMG PST SGM RTA: CPT | Performed by: OPHTHALMOLOGY

## 2021-08-12 PROCEDURE — 83861 MICROFLUID ANALY TEARS: CPT | Performed by: OPHTHALMOLOGY

## 2021-08-12 ASSESSMENT — REFRACTION_AUTOREFRACTION
OS_AXIS: 097
OD_CYLINDER: -0.25
OD_SPHERE: PL
OD_AXIS: 127
OS_CYLINDER: -1.25
OS_SPHERE: +1.00

## 2021-08-12 ASSESSMENT — CONFRONTATIONAL VISUAL FIELD TEST (CVF)
OS_FINDINGS: FULL
OD_FINDINGS: FULL

## 2021-08-12 ASSESSMENT — VISUAL ACUITY
OD_BCVA: 20/25+2
OS_BCVA: 20/30+2

## 2021-08-12 ASSESSMENT — TEAR BREAK UP TIME (TBUT)
OS_TBUT: T
OD_TBUT: T

## 2021-08-12 ASSESSMENT — SPHEQUIV_DERIVED: OS_SPHEQUIV: 0.375

## 2021-09-17 ENCOUNTER — HOSPITAL ENCOUNTER (OUTPATIENT)
Dept: CT IMAGING | Facility: HOSPITAL | Age: 71
Discharge: HOME | End: 2021-09-17
Payer: COMMERCIAL

## 2021-09-17 DIAGNOSIS — R29.898 OTHER SYMPTOMS AND SIGNS INVOLVING THE MUSCULOSKELETAL SYSTEM: ICD-10-CM

## 2021-09-17 DIAGNOSIS — S99.911A UNSPECIFIED INJURY OF RIGHT ANKLE, INITIAL ENCOUNTER: ICD-10-CM

## 2021-09-17 DIAGNOSIS — R20.9 UNSPECIFIED DISTURBANCES OF SKIN SENSATION: ICD-10-CM

## 2021-09-17 PROCEDURE — 70450 CT HEAD/BRAIN W/O DYE: CPT

## 2021-09-20 ENCOUNTER — OFFICE VISIT (OUTPATIENT)
Dept: OBGYN CLINIC | Facility: CLINIC | Age: 71
End: 2021-09-20
Payer: COMMERCIAL

## 2021-09-20 ENCOUNTER — TELEPHONE (OUTPATIENT)
Dept: OBGYN CLINIC | Facility: CLINIC | Age: 71
End: 2021-09-20

## 2021-09-20 VITALS
DIASTOLIC BLOOD PRESSURE: 76 MMHG | BODY MASS INDEX: 32.39 KG/M2 | SYSTOLIC BLOOD PRESSURE: 134 MMHG | WEIGHT: 176 LBS | HEART RATE: 102 BPM | HEIGHT: 62 IN | TEMPERATURE: 97.9 F

## 2021-09-20 DIAGNOSIS — S82.839A AVULSION FRACTURE OF DISTAL FIBULA: ICD-10-CM

## 2021-09-20 DIAGNOSIS — M25.571 ACUTE RIGHT ANKLE PAIN: Primary | ICD-10-CM

## 2021-09-20 PROCEDURE — 99204 OFFICE O/P NEW MOD 45 MIN: CPT | Performed by: ORTHOPAEDIC SURGERY

## 2021-09-20 RX ORDER — UBIDECARENONE 100 MG
CAPSULE ORAL
COMMUNITY

## 2021-09-20 RX ORDER — LEVOTHYROXINE SODIUM 112 UG/1
TABLET ORAL
COMMUNITY
Start: 2021-08-05

## 2021-09-20 RX ORDER — ASPIRIN 81 MG/1
TABLET ORAL
COMMUNITY

## 2021-09-20 RX ORDER — ATORVASTATIN CALCIUM 40 MG/1
TABLET, FILM COATED ORAL
COMMUNITY

## 2021-09-20 RX ORDER — IBANDRONATE SODIUM 150 MG/1
1 TABLET, FILM COATED ORAL
COMMUNITY

## 2021-09-20 RX ORDER — BIOTIN 1 MG
TABLET ORAL
COMMUNITY

## 2021-09-20 RX ORDER — AMLODIPINE BESYLATE 5 MG/1
TABLET ORAL
COMMUNITY
Start: 2021-09-16

## 2021-09-20 NOTE — PROGRESS NOTES
ASSESSMENT/PLAN:    Diagnoses and all orders for this visit:    Acute right ankle pain  -     Ankle Cude ankle/Ankle Brace    Avulsion fracture of distal fibula  -     Ankle Cude ankle/Ankle Brace    Other orders  -     amLODIPine (NORVASC) 5 mg tablet  -     aspirin (Aspir-Low) 81 mg EC tablet; Take by mouth  -     atorvastatin (LIPITOR) 40 mg tablet  -     benazepril-hydrochlorthiazide (LOTENSIN HCT) 10-12 5 MG per tablet; benazepril 10 mg-hydrochlorothiazide 12 5 mg tablet  -     Cholecalciferol (Vitamin D3) 25 MCG (1000 UT) CAPS  -     co-enzyme Q-10 100 mg capsule; Take by mouth  -     ibandronate (Boniva) 150 MG tablet; Take 1 tablet by mouth  -     levothyroxine 112 mcg tablet  -     Multiple Vitamin (MULTIVITAMIN PO)        X-rays performed 9/17 were reviewed with the patient  She was given the option between a cast boot or a whole ankle brace  She opted for the ankle brace  I find this is reasonable as her pain has been well controlled  She may continue to weight-bear as tolerated  She may use OTC analgesics for pain control as needed  We will see her back in 3 weeks for recheck  X-rays to be taken only if clinically indicated  She was encouraged to contact the office should questions or concerns arise  Return in about 3 weeks (around 10/11/2021)  _____________________________________________________  CHIEF COMPLAINT:  Chief Complaint   Patient presents with    Right Ankle - Fracture         SUBJECTIVE:  Phuc Lora is a 73 y o  female who presents for evaluation of  Right ankle pain  She reports on Thursday she attempted to get up, noted her foot was asleep, and then subsequently fell with her 1st step after standing  She noted pain and bruising in the lateral aspect of her ankle and presented to her PCP the following day for evaluation  X-rays were obtained and were read as showing a fracture    She has been using an Ace wrap and a postop shoe which she had from a prior injury which she does  Admit helps her symptoms  She has been taking OTC analgesics occasionally for pain control  Overall, she reports significant improvement in her symptoms since Friday  She denies numbness or tingling    She does report a prior stress fracture in the right foot but denies any other injuries or  Surgeries in the right lower extremity    PAST MEDICAL HISTORY:  Past Medical History:   Diagnosis Date    Disease of thyroid gland     High cholesterol     Hypertension        PAST SURGICAL HISTORY:  Past Surgical History:   Procedure Laterality Date    HYSTERECTOMY      age 52   Reggie Hero OOPHORECTOMY Bilateral     age 52       FAMILY HISTORY:  Family History   Problem Relation Age of Onset    No Known Problems Mother     No Known Problems Father     No Known Problems Daughter     No Known Problems Maternal Grandmother     No Known Problems Maternal Grandfather     No Known Problems Paternal Grandmother     No Known Problems Paternal Grandfather     No Known Problems Daughter     No Known Problems Maternal Aunt     No Known Problems Paternal Aunt     No Known Problems Paternal Aunt     No Known Problems Paternal Aunt     No Known Problems Paternal Aunt     No Known Problems Paternal Aunt     No Known Problems Paternal Aunt        SOCIAL HISTORY:  Social History     Tobacco Use    Smoking status: Never Smoker    Smokeless tobacco: Never Used   Vaping Use    Vaping Use: Never used   Substance Use Topics    Alcohol use: Yes     Comment: occasionally    Drug use: Never       MEDICATIONS:    Current Outpatient Medications:     amLODIPine (NORVASC) 5 mg tablet, , Disp: , Rfl:     aspirin (Aspir-Low) 81 mg EC tablet, Take by mouth, Disp: , Rfl:     atorvastatin (LIPITOR) 40 mg tablet, , Disp: , Rfl:     benazepril-hydrochlorthiazide (LOTENSIN HCT) 10-12 5 MG per tablet, benazepril 10 mg-hydrochlorothiazide 12 5 mg tablet, Disp: , Rfl:     Cholecalciferol (Vitamin D3) 25 MCG (1000 UT) CAPS, , Disp: , Rfl:     co-enzyme Q-10 100 mg capsule, Take by mouth, Disp: , Rfl:     ibandronate (Boniva) 150 MG tablet, Take 1 tablet by mouth, Disp: , Rfl:     levothyroxine 112 mcg tablet, , Disp: , Rfl:     Multiple Vitamin (MULTIVITAMIN PO), , Disp: , Rfl:     ALLERGIES:  No Known Allergies    Review of systems:   Constitutional: Negative for fatigue, fever or loss of apetite  HENT: Negative  Respiratory: Negative for shortness of breath, dyspnea  Cardiovascular: Negative for chest pain/tightness  Gastrointestinal: Negative for abdominal pain, N/V  Endocrine: Negative for cold/heat intolerance, unexplained weight loss/gain  Genitourinary: Negative for flank pain, dysuria, hematuria  Musculoskeletal:  Positive as stated in the HPI   Skin: Negative for rash  Neurological:  Negative for numbness and tingling  Psychiatric/Behavioral: Negative for agitation  _____________________________________________________  PHYSICAL EXAMINATION:    Blood pressure 134/76, pulse 102, temperature 97 9 °F (36 6 °C), temperature source Temporal, height 5' 2" (1 575 m), weight 79 8 kg (176 lb)  General: well developed and well nourished, alert, oriented times 3 and appears comfortable  Psychiatric: Normal  HEENT: Benign  Cardiovascular: Regular    Pulmonary: No wheezing or stridor  Abdomen: Soft, Nontender  Skin: No masses, erythema, lacerations, fluctation, ulcerations  Neurovascular: Motor and sensory exams are grossly intact  Distal pulses are palpable  Limb is warm and well perfused with good color and capillary refill  MUSCULOSKELETAL EXAMINATION:     the right ankle exam demonstrates skin intact, no erythema  Swelling and ecchymosis as to be expected post injury  Tenderness to palpation over the distal fibula  She is able to actively range the ankle but does complain of pain laterally with inversion  Syndesmotic squeeze does not elicit any complaints  She has no tenderness medially    The remainder of the right lower extremity exam is benign  _____________________________________________________  STUDIES REVIEWED:  I have personally reviewed pertinent films and reports in PACS  X-rays of the right ankle performed 09/17/2021 reviewed and demonstrate extra-articular fracture off the very distal tip of the fibula  PROCEDURES PERFORMED:  Procedures  None performed today      Gallo Herzog

## 2021-09-21 ENCOUNTER — TELEPHONE (OUTPATIENT)
Dept: OBGYN CLINIC | Facility: CLINIC | Age: 71
End: 2021-09-21

## 2021-10-13 ENCOUNTER — OFFICE VISIT (OUTPATIENT)
Dept: OBGYN CLINIC | Facility: CLINIC | Age: 71
End: 2021-10-13
Payer: COMMERCIAL

## 2021-10-13 VITALS
DIASTOLIC BLOOD PRESSURE: 80 MMHG | TEMPERATURE: 97.2 F | HEART RATE: 65 BPM | WEIGHT: 176 LBS | BODY MASS INDEX: 32.39 KG/M2 | SYSTOLIC BLOOD PRESSURE: 158 MMHG | HEIGHT: 62 IN

## 2021-10-13 DIAGNOSIS — S82.839A AVULSION FRACTURE OF DISTAL FIBULA: Primary | ICD-10-CM

## 2021-10-13 PROCEDURE — 99213 OFFICE O/P EST LOW 20 MIN: CPT | Performed by: ORTHOPAEDIC SURGERY

## 2021-12-15 ENCOUNTER — EVALUATION (OUTPATIENT)
Dept: PHYSICAL THERAPY | Facility: CLINIC | Age: 71
End: 2021-12-15
Payer: COMMERCIAL

## 2021-12-15 DIAGNOSIS — M25.519 ARTHRALGIA OF SHOULDER, UNSPECIFIED LATERALITY: Primary | ICD-10-CM

## 2021-12-15 DIAGNOSIS — G89.29 CHRONIC LEFT SHOULDER PAIN: ICD-10-CM

## 2021-12-15 DIAGNOSIS — M25.512 CHRONIC LEFT SHOULDER PAIN: ICD-10-CM

## 2021-12-15 PROCEDURE — 97535 SELF CARE MNGMENT TRAINING: CPT | Performed by: PHYSICAL THERAPIST

## 2021-12-15 PROCEDURE — 97161 PT EVAL LOW COMPLEX 20 MIN: CPT | Performed by: PHYSICAL THERAPIST

## 2021-12-21 ENCOUNTER — OFFICE VISIT (OUTPATIENT)
Dept: PHYSICAL THERAPY | Facility: CLINIC | Age: 71
End: 2021-12-21
Payer: COMMERCIAL

## 2021-12-21 DIAGNOSIS — M25.512 CHRONIC LEFT SHOULDER PAIN: ICD-10-CM

## 2021-12-21 DIAGNOSIS — M25.519 ARTHRALGIA OF SHOULDER, UNSPECIFIED LATERALITY: Primary | ICD-10-CM

## 2021-12-21 DIAGNOSIS — G89.29 CHRONIC LEFT SHOULDER PAIN: ICD-10-CM

## 2021-12-21 PROCEDURE — 97112 NEUROMUSCULAR REEDUCATION: CPT

## 2021-12-21 PROCEDURE — 97110 THERAPEUTIC EXERCISES: CPT

## 2021-12-23 ENCOUNTER — OFFICE VISIT (OUTPATIENT)
Dept: PHYSICAL THERAPY | Facility: CLINIC | Age: 71
End: 2021-12-23
Payer: COMMERCIAL

## 2021-12-23 DIAGNOSIS — M25.519 ARTHRALGIA OF SHOULDER, UNSPECIFIED LATERALITY: Primary | ICD-10-CM

## 2021-12-23 DIAGNOSIS — G89.29 CHRONIC LEFT SHOULDER PAIN: ICD-10-CM

## 2021-12-23 DIAGNOSIS — M25.512 CHRONIC LEFT SHOULDER PAIN: ICD-10-CM

## 2021-12-23 PROCEDURE — 97112 NEUROMUSCULAR REEDUCATION: CPT | Performed by: PHYSICAL THERAPIST

## 2021-12-23 PROCEDURE — 97110 THERAPEUTIC EXERCISES: CPT | Performed by: PHYSICAL THERAPIST

## 2021-12-28 ENCOUNTER — OFFICE VISIT (OUTPATIENT)
Dept: PHYSICAL THERAPY | Facility: CLINIC | Age: 71
End: 2021-12-28
Payer: COMMERCIAL

## 2021-12-28 DIAGNOSIS — M25.519 ARTHRALGIA OF SHOULDER, UNSPECIFIED LATERALITY: Primary | ICD-10-CM

## 2021-12-28 DIAGNOSIS — G89.29 CHRONIC LEFT SHOULDER PAIN: ICD-10-CM

## 2021-12-28 DIAGNOSIS — M25.512 CHRONIC LEFT SHOULDER PAIN: ICD-10-CM

## 2021-12-28 PROCEDURE — 97110 THERAPEUTIC EXERCISES: CPT

## 2021-12-28 PROCEDURE — 97112 NEUROMUSCULAR REEDUCATION: CPT

## 2021-12-30 ENCOUNTER — OFFICE VISIT (OUTPATIENT)
Dept: PHYSICAL THERAPY | Facility: CLINIC | Age: 71
End: 2021-12-30
Payer: COMMERCIAL

## 2021-12-30 DIAGNOSIS — M25.512 CHRONIC LEFT SHOULDER PAIN: ICD-10-CM

## 2021-12-30 DIAGNOSIS — G89.29 CHRONIC LEFT SHOULDER PAIN: ICD-10-CM

## 2021-12-30 DIAGNOSIS — M25.519 ARTHRALGIA OF SHOULDER, UNSPECIFIED LATERALITY: Primary | ICD-10-CM

## 2021-12-30 PROCEDURE — 97110 THERAPEUTIC EXERCISES: CPT

## 2021-12-30 PROCEDURE — 97112 NEUROMUSCULAR REEDUCATION: CPT

## 2022-01-05 ENCOUNTER — OFFICE VISIT (OUTPATIENT)
Dept: PHYSICAL THERAPY | Facility: CLINIC | Age: 72
End: 2022-01-05
Payer: COMMERCIAL

## 2022-01-05 DIAGNOSIS — M25.519 ARTHRALGIA OF SHOULDER, UNSPECIFIED LATERALITY: Primary | ICD-10-CM

## 2022-01-05 DIAGNOSIS — M25.512 CHRONIC LEFT SHOULDER PAIN: ICD-10-CM

## 2022-01-05 DIAGNOSIS — G89.29 CHRONIC LEFT SHOULDER PAIN: ICD-10-CM

## 2022-01-05 PROCEDURE — 97112 NEUROMUSCULAR REEDUCATION: CPT

## 2022-01-05 PROCEDURE — 97110 THERAPEUTIC EXERCISES: CPT

## 2022-01-05 NOTE — PROGRESS NOTES
Daily Note     Today's date: 2022  Patient name: Joseph Lane  : 1950  MRN: [id number]  Referring provider: Atif Mccoy MD  Dx:   Encounter Diagnosis     ICD-10-CM    1  Arthralgia of shoulder, unspecified laterality  M25 519    2  Chronic left shoulder pain  M25 512     G89 29                   Subjective: Pt states she is doing good today      Objective: See treatment diary below      Assessment: Pt tolerated treatment session fairly well today  Greatest deficit continues to be ER strength as evidenced by compensation with ER exercises  PT modified and gave v/c to perform exercises appropriately  Plan: Continue per plan of care        Precautions:  Left RC tear       Manuals    N/A                                 Neuro Re-Ed        TB MTP and LTP  2x10 ea GREEN 2x10 ea RTB 2x10 Each   Green  3x10 Each   Green  2x10 ea GREEN   TB Bilateral ER and horizontal ABD 2x10 ea RED YTB 2x10 ea 2x10 Each   Red  2x10 Each   Red 2x10 ea RED   Scapular pinch into wall  20x5" 2x10, 5" 10x5" Hold  5"x20  10x5"   S/L Scapular 4 way  2x10ea 1#  2x10 Each   1#  2x10 Each   1#  2x10ea 1#   Prone Row  1# 2x10   1# 2x10 1# 2x10   Prone IYT  2x10 ea   10x ea 2x10 ea   Ball into wall  2x10 circles cw/ccw  2x10 Circles   Shld and OH  2x10 Circles cw/ccw  Shld and OH  2x10 circles cw/ccw   Prone 90/90 10x                       Ther Ex        UBE  10' 100resist alt 10' reto/fwd 10 min   100 resist  Alt  10 min   100 resist  Alt 10' 100resist alt   Doorway pec stretch  5x15" 5x15" 5x15"  5x15"  5x15"   Caudel glide  5x15" bilat 10x10" 5x15" Hold   Bilat  5x15" Hold   Bilat 5x15" bilat                                   HEP update/review         Ther Activity        Cone stacking onto shelf On/off 3x with 2#               Gait Training                        Modalities        CP to the left shoulder/UB in seated 15' 15 min  15 min  15 min 15'

## 2022-01-07 ENCOUNTER — APPOINTMENT (OUTPATIENT)
Dept: PHYSICAL THERAPY | Facility: CLINIC | Age: 72
End: 2022-01-07
Payer: COMMERCIAL

## 2022-01-11 ENCOUNTER — OFFICE VISIT (OUTPATIENT)
Dept: PHYSICAL THERAPY | Facility: CLINIC | Age: 72
End: 2022-01-11
Payer: COMMERCIAL

## 2022-01-11 DIAGNOSIS — M25.519 ARTHRALGIA OF SHOULDER, UNSPECIFIED LATERALITY: Primary | ICD-10-CM

## 2022-01-11 DIAGNOSIS — G89.29 CHRONIC LEFT SHOULDER PAIN: ICD-10-CM

## 2022-01-11 DIAGNOSIS — M25.512 CHRONIC LEFT SHOULDER PAIN: ICD-10-CM

## 2022-01-11 PROCEDURE — 97112 NEUROMUSCULAR REEDUCATION: CPT

## 2022-01-11 PROCEDURE — 97110 THERAPEUTIC EXERCISES: CPT

## 2022-01-11 NOTE — PROGRESS NOTES
PT Discharge    Today's date: 2022  Patient name: Palak Borjas  : 1950  MRN: [id number]  Referring provider: Rosanne Busby MD  Dx:   Encounter Diagnosis     ICD-10-CM    1  Arthralgia of shoulder, unspecified laterality  M25 519    2  Chronic left shoulder pain  M25 512     G89 29                   Assessment  Assessment details: Pt has attended a total of 5 PT sessions and has made adequate progress towards goals to be d/c from PT services  PT reviewed and instructed HEP (handout provided) along with answering pt questions regarding current functional status  Pt has no concerns at time of discharge  Thank you! Understanding of Dx/Px/POC: good   Prognosis: good    Goals  ST  Initiate HEP  Met  2  Decrease pain levels by 25-50%  Met   3  Increase strength by 1-2 mm grades  Met  LT  Improve postural awareness Met  2  Improve scapular stability   Met  3  Decrease limitations with reaching, lifting and carrying Met  4  Decrease limitations with push/pull and OH activities  Met  5  Decrease limitations with ADL  Met  6  DC with HEP   Met        Subjective Evaluation    History of Present Illness  Mechanism of injury: Pt feeling prepared for discharge with HEP this date     Diagnostic Tests  X-ray: normal  MRI studies: abnormal        Objective     Palpation   Left   Tenderness of the deltoid  Tenderness   Cervical Spine   Tenderness in the left scapula       Neurological Testing     Reflexes   Left   Biceps (C5/C6): normal (2+)  Brachioradialis (C6): normal (2+)    Right   Biceps (C5/C6): normal (2+)  Brachioradialis (C6): normal (2+)    Active Range of Motion   Cervical/Thoracic Spine     Normal active range of motion  Left Shoulder   Normal active range of motion    Right Shoulder   Normal active range of motion    Left Elbow   Normal active range of motion    Right Elbow   Normal active range of motion    Scapular Mobility   Left Shoulder   Scapular Dyskinesis: grade I and grade II  Scapular mobility: good    Strength/Myotome Testing     Left Shoulder     Planes of Motion   Flexion: 4-   Extension: 4   Abduction: 4-   Adduction: 4   External rotation at 0°: 3+   External rotation at 90°: 3+   Internal rotation at 0°: 4+   Internal rotation at 90°: 4+     Right Shoulder   Normal muscle strength    Left Elbow   Flexion: 4+  Extension: 4-    Right Elbow   Normal strength    Tests     Left Shoulder   Negative Hawkin's and Speed's                Precautions:  Left RC tear       Manuals 1/5 1/11 12/23 12/28 12/30   N/A                                 Neuro Re-Ed     12/30   TB MTP and LTP  2x10 ea GREEN 2x10 ea GREEN 2x10 Each   Green  3x10 Each   Green  2x10 ea GREEN   TB Bilateral ER and horizontal ABD 2x10 ea RED 2x10 ea RED 2x10 Each   Red  2x10 Each   Red 2x10 ea RED   Scapular pinch into wall  20x5"  10x5" Hold  5"x20  10x5"   S/L Scapular 4 way  2x10ea 1# 2x10ea 1# 2x10 Each   1#  2x10 Each   1#  2x10ea 1#   Prone Row  1# 2x10   1# 2x10 1# 2x10   Prone IYT  2x10 ea   10x ea 2x10 ea   Ball into wall  2x10 circles cw/ccw  2x10 Circles   Shld and OH  2x10 Circles cw/ccw  Shld and OH  2x10 circles cw/ccw   Prone 90/90 10x                       Ther Ex     12/30   UBE  10' 100resist alt 10' 100resist alt 10 min   100 resist  Alt  10 min   100 resist  Alt 10' 100resist alt   Doorway pec stretch  5x15" 5x15" 5x15"  5x15"  5x15"   Caudel glide  5x15" bilat 5x15" bilat 5x15" Hold   Bilat  5x15" Hold   Bilat 5x15" bilat                                   HEP update/review   15'      Ther Activity     12/30   Cone stacking onto shelf On/off 3x with 2#               Gait Training     12/30                   Modalities     12/30   CP to the left shoulder/UB in seated 15'  15 min  15 min 15'

## 2022-01-11 NOTE — LETTER
2022    Patric Buerger, [address] [place] [address] [place] [address]    Patient: Jenni Porras   YOB: 1950   Date of Visit: 2022     Encounter Diagnosis     ICD-10-CM    1  Arthralgia of shoulder, unspecified laterality  M25 519    2  Chronic left shoulder pain  M25 512     G89 29        Dear Dr Priscilla Chahal: Thank you for your recent referral of Jenni Porras  Please review the attached evaluation summary from Afsaneh's recent visit  Please verify that you agree with the plan of care by signing the attached order  If you have any questions or concerns, please do not hesitate to call  I sincerely appreciate the opportunity to share in the care of one of your patients and hope to have another opportunity to work with you in the near future  Sincerely,    Lissy Samuels, PT      Referring Provider:      I certify that I have read the below Plan of Care and certify the need for these services furnished under this plan of treatment while under my care  Patric Buerger, MD  [address] [place] [address] [place] [address]  Via Fax: [phone]          PT Discharge    Today's date: 2022  Patient name: Jenni Porras  : 1950  MRN: [id number]  Referring provider: Monique Villalpando MD  Dx:   Encounter Diagnosis     ICD-10-CM    1  Arthralgia of shoulder, unspecified laterality  M25 519    2  Chronic left shoulder pain  M25 512     G89 29                   Assessment  Assessment details: Pt has attended a total of 5 PT sessions and has made adequate progress towards goals to be d/c from PT services  PT reviewed and instructed HEP (handout provided) along with answering pt questions regarding current functional status  Pt has no concerns at time of discharge  Thank you! Understanding of Dx/Px/POC: good   Prognosis: good    Goals  ST  Initiate HEP  Met  2  Decrease pain levels by 25-50%  Met   3    Increase strength by 1-2 mm grades  Met  LT  Improve postural awareness Met  2  Improve scapular stability   Met  3  Decrease limitations with reaching, lifting and carrying Met  4  Decrease limitations with push/pull and OH activities  Met  5  Decrease limitations with ADL  Met  6  DC with HEP   Met        Subjective Evaluation    History of Present Illness  Mechanism of injury: Pt feeling prepared for discharge with HEP this date     Diagnostic Tests  X-ray: normal  MRI studies: abnormal        Objective     Palpation   Left   Tenderness of the deltoid  Tenderness   Cervical Spine   Tenderness in the left scapula  Neurological Testing     Reflexes   Left   Biceps (C5/C6): normal (2+)  Brachioradialis (C6): normal (2+)    Right   Biceps (C5/C6): normal (2+)  Brachioradialis (C6): normal (2+)    Active Range of Motion   Cervical/Thoracic Spine     Normal active range of motion  Left Shoulder   Normal active range of motion    Right Shoulder   Normal active range of motion    Left Elbow   Normal active range of motion    Right Elbow   Normal active range of motion    Scapular Mobility   Left Shoulder   Scapular Dyskinesis: grade I and grade II  Scapular mobility: good    Strength/Myotome Testing     Left Shoulder     Planes of Motion   Flexion: 4-   Extension: 4   Abduction: 4-   Adduction: 4   External rotation at 0°: 3+   External rotation at 90°: 3+   Internal rotation at 0°: 4+   Internal rotation at 90°: 4+     Right Shoulder   Normal muscle strength    Left Elbow   Flexion: 4+  Extension: 4-    Right Elbow   Normal strength    Tests     Left Shoulder   Negative Hawkin's and Speed's                Precautions:  Left RC tear       Manuals    N/A                                 Neuro Re-Ed        TB MTP and LTP  2x10 ea GREEN 2x10 ea GREEN 2x10 Each   Green  3x10 Each   Green  2x10 ea GREEN   TB Bilateral ER and horizontal ABD 2x10 ea RED 2x10 ea RED 2x10 Each   Red  2x10 Each   Red 2x10 ea RED   Scapular pinch into wall  20x5"  10x5" Hold  5"x20  10x5"   S/L Scapular 4 way  2x10ea 1# 2x10ea 1# 2x10 Each   1#  2x10 Each   1#  2x10ea 1#   Prone Row  1# 2x10   1# 2x10 1# 2x10   Prone IYT  2x10 ea   10x ea 2x10 ea   Ball into wall  2x10 circles cw/ccw  2x10 Circles   Shld and OH  2x10 Circles cw/ccw  Shld and OH  2x10 circles cw/ccw   Prone 90/90 10x                       Ther Ex     12/30   UBE  10' 100resist alt 10' 100resist alt 10 min   100 resist  Alt  10 min   100 resist  Alt 10' 100resist alt   Doorway pec stretch  5x15" 5x15" 5x15"  5x15"  5x15"   Caudel glide  5x15" bilat 5x15" bilat 5x15" Hold   Bilat  5x15" Hold   Bilat 5x15" bilat                                   HEP update/review   15'      Ther Activity     12/30   Cone stacking onto shelf On/off 3x with 2#               Gait Training     12/30                   Modalities     12/30   CP to the left shoulder/UB in seated 15'  15 min  15 min 15'

## 2022-01-13 ENCOUNTER — APPOINTMENT (OUTPATIENT)
Dept: PHYSICAL THERAPY | Facility: CLINIC | Age: 72
End: 2022-01-13
Payer: COMMERCIAL

## 2022-01-18 ENCOUNTER — APPOINTMENT (OUTPATIENT)
Dept: PHYSICAL THERAPY | Facility: CLINIC | Age: 72
End: 2022-01-18
Payer: COMMERCIAL

## 2022-01-20 ENCOUNTER — APPOINTMENT (OUTPATIENT)
Dept: PHYSICAL THERAPY | Facility: CLINIC | Age: 72
End: 2022-01-20
Payer: COMMERCIAL

## 2022-03-08 ENCOUNTER — HOSPITAL ENCOUNTER (OUTPATIENT)
Dept: RADIOLOGY | Facility: CLINIC | Age: 72
Discharge: HOME | End: 2022-03-08
Payer: COMMERCIAL

## 2022-03-08 VITALS — BODY MASS INDEX: 32.39 KG/M2 | HEIGHT: 62 IN | WEIGHT: 176 LBS

## 2022-03-08 DIAGNOSIS — Z12.31 ENCOUNTER FOR SCREENING MAMMOGRAM FOR MALIGNANT NEOPLASM OF BREAST: ICD-10-CM

## 2022-03-08 PROCEDURE — 77063 BREAST TOMOSYNTHESIS BI: CPT

## 2022-03-08 PROCEDURE — 77067 SCR MAMMO BI INCL CAD: CPT

## 2022-03-16 ENCOUNTER — HOSPITAL ENCOUNTER (OUTPATIENT)
Dept: RADIOLOGY | Facility: CLINIC | Age: 72
Discharge: HOME | End: 2022-03-16
Payer: COMMERCIAL

## 2022-03-16 ENCOUNTER — HOSPITAL ENCOUNTER (OUTPATIENT)
Dept: RADIOLOGY | Facility: CLINIC | Age: 72
Discharge: HOME | End: 2022-03-16
Admitting: RADIOLOGY
Payer: COMMERCIAL

## 2022-03-16 VITALS — SYSTOLIC BLOOD PRESSURE: 131 MMHG | RESPIRATION RATE: 16 BRPM | DIASTOLIC BLOOD PRESSURE: 75 MMHG | HEART RATE: 79 BPM

## 2022-03-16 VITALS — BODY MASS INDEX: 32.39 KG/M2 | WEIGHT: 176 LBS | HEIGHT: 62 IN

## 2022-03-16 DIAGNOSIS — R92.8 ABNORMAL MAMMOGRAM: ICD-10-CM

## 2022-03-16 DIAGNOSIS — R92.8 ABNORMAL SCREENING MAMMOGRAM: ICD-10-CM

## 2022-03-16 PROCEDURE — 88305 TISSUE EXAM BY PATHOLOGIST: CPT | Performed by: PATHOLOGY

## 2022-03-16 PROCEDURE — A4648 IMPLANTABLE TISSUE MARKER: HCPCS

## 2022-03-16 PROCEDURE — 76642 ULTRASOUND BREAST LIMITED: CPT

## 2022-03-16 PROCEDURE — 19083 BX BREAST 1ST LESION US IMAG: CPT

## 2022-03-16 RX ORDER — LIDOCAINE HYDROCHLORIDE 10 MG/ML
5 INJECTION, SOLUTION EPIDURAL; INFILTRATION; INTRACAUDAL; PERINEURAL ONCE
Status: COMPLETED | OUTPATIENT
Start: 2022-03-16 | End: 2022-03-16

## 2022-03-16 RX ADMIN — LIDOCAINE HYDROCHLORIDE 5 ML: 10 INJECTION, SOLUTION EPIDURAL; INFILTRATION; INTRACAUDAL; PERINEURAL at 11:04

## 2022-03-16 NOTE — PROGRESS NOTES
Met with patient and Dr Tenzin Hurd  regarding recommendation for;    ___X__ RIGHT ______LEFT      ___X__Ultrasound guided  ______Stereotactic breast biopsy  __X___Verbalized understanding        Blood thinners:  No: _____ Yes: ___X___ What: ASA 81mg                Biopsy teaching sheet given:  Yes: ___X___ No: ________    Pt given contact information and adv to call with any questions/needs

## 2022-03-16 NOTE — PROGRESS NOTES
Procedure type:    ___X__ultrasound guided _____stereotactic    Breast:    _____Left ___X__Right    Location: right breast 12 oclock 3CMFN    Needle: 12G Bria    # of passes: 2 passes    Clip: heart    Performed by: Dr Tye Sandifer held for 5 minutes by: Dilma Randle RN     Steri Strips:    ___X__yes _____no    Darby Alex aid:    __X___yes_____no    Tolerated procedure:    __X___yes _____no

## 2022-03-16 NOTE — DISCHARGE INSTR - OTHER ORDERS
POST LARGE CORE BREAST BIOPSY PATIENT INFORMATION      Place an ice pack inside your bra over the top of the dressing every hour for 20 minutes (20 minutes on, 60 minutes off)  Do this until bedtime  Do not shower or bathe until the following morning  You may bathe your breast carefully with the steri-strips in place  Be careful    Not to loosen them  The steri-strips will fall off in 3-5 days  You may have mild discomfort, and you may have some bruising where the   Needle entered the skin  This should clear within 5-7 days  If you need medicine for discomfort, take acetaminophen products such as   Tylenol  You may also take Advil or Motrin products  Do not participate in strenuous activities such as-tennis, aerobics, skiing,  Weight lifting, etc  for 24 hours  Refrain from swimming/soaking for 72 hours  Wearing a bra for sleeping may be more comfortable for the first 24-48 hours  Watch for continued bleeding, pain or fever over 101  If any of these symptoms occur, please contact our    breast nurse navigator at the location where your biopsy was performed  During normal business hours (7:30 am-4:00 pm) please call the nurse navigator at the site where your   procedure was performed:    [facility] [address]: [phone] or   [facility] Floor: [phone] or [phone]  Jack [address]agen [address]: [address] [facility]: [address] [place] [address] Street: [phone]              After 4 PM - please call your physician or go to the nearest Emergency Department location  9          The final results of your biopsy are usually available within one week

## 2022-03-16 NOTE — PROGRESS NOTES
Patient arrived via:    __X___ambulatory    _____wheelchair    _____stretcher        Breast Implants:    _______yes ____X____no

## 2022-03-16 NOTE — PROGRESS NOTES
Ice pack given:    __X___yes _____no    Discharge instructions signed by patient:    __X___yes _____no    Discharge instructions given to patient:    __X___yes _____no    Discharged via:    __X___amulatory    _____wheelchair    _____stretcher    Stable on discharge:    __X___yes ____no    Site check upon discharge without any active bleeding  Pt denies pain upon discharge

## 2022-03-17 NOTE — PROGRESS NOTES
Post procedure call completed 3/17/22 at 1319    Bleeding: _____yes __X___no    Pain: _____yes ___X___no    Redness/Swelling: ______yes ___X___no    Band aid removed: _____yes ___X__no (did not shower yet today, will remove later today)    Steri-Strips intact: ___X___yes _____no (discussed with patient to remove steri strips on day 5 if they have not come off on their own)    Pt with no questions at this time, adv will call when results available, adv to call with any questions or concerns, has name/# for contact

## 2022-03-18 ENCOUNTER — TELEPHONE (OUTPATIENT)
Dept: MAMMOGRAPHY | Facility: CLINIC | Age: 72
End: 2022-03-18

## 2022-09-21 ENCOUNTER — HOSPITAL ENCOUNTER (OUTPATIENT)
Dept: RADIOLOGY | Facility: CLINIC | Age: 72
Discharge: HOME | End: 2022-09-21
Payer: COMMERCIAL

## 2022-09-21 VITALS — HEIGHT: 63 IN | BODY MASS INDEX: 31.01 KG/M2 | WEIGHT: 175 LBS

## 2022-09-21 DIAGNOSIS — Z09 FOLLOW-UP EXAM, 3-6 MONTHS SINCE PREVIOUS EXAM: ICD-10-CM

## 2022-09-21 PROCEDURE — G0279 TOMOSYNTHESIS, MAMMO: HCPCS

## 2022-09-21 PROCEDURE — 77065 DX MAMMO INCL CAD UNI: CPT

## 2022-09-21 PROCEDURE — 76642 ULTRASOUND BREAST LIMITED: CPT

## 2022-10-05 ENCOUNTER — HOSPITAL ENCOUNTER (INPATIENT)
Facility: HOSPITAL | Age: 72
LOS: 2 days | Discharge: HOME | DRG: 871 | End: 2022-10-07
Attending: EMERGENCY MEDICINE | Admitting: FAMILY MEDICINE
Payer: COMMERCIAL

## 2022-10-05 ENCOUNTER — APPOINTMENT (OUTPATIENT)
Dept: RADIOLOGY | Facility: HOSPITAL | Age: 72
DRG: 871 | End: 2022-10-05
Payer: COMMERCIAL

## 2022-10-05 DIAGNOSIS — N30.01 ACUTE CYSTITIS WITH HEMATURIA: ICD-10-CM

## 2022-10-05 DIAGNOSIS — J18.9 PNEUMONIA: ICD-10-CM

## 2022-10-05 DIAGNOSIS — U07.1 COVID-19: Primary | ICD-10-CM

## 2022-10-05 DIAGNOSIS — R11.2 NAUSEA AND VOMITING: ICD-10-CM

## 2022-10-05 LAB
ALBUMIN SERPL BCP-MCNC: 3.3 G/DL (ref 3.5–5)
ALP SERPL-CCNC: 147 U/L (ref 46–116)
ALT SERPL W P-5'-P-CCNC: 79 U/L (ref 12–78)
ANION GAP SERPL CALCULATED.3IONS-SCNC: 12 MMOL/L (ref 4–13)
AST SERPL W P-5'-P-CCNC: 42 U/L (ref 5–45)
BASOPHILS # BLD AUTO: 0.07 THOUSANDS/ΜL (ref 0–0.1)
BASOPHILS NFR BLD AUTO: 0 % (ref 0–1)
BILIRUB SERPL-MCNC: 1.21 MG/DL (ref 0.2–1)
BUN SERPL-MCNC: 15 MG/DL (ref 5–25)
CALCIUM ALBUM COR SERPL-MCNC: 9.6 MG/DL (ref 8.3–10.1)
CALCIUM SERPL-MCNC: 9 MG/DL (ref 8.3–10.1)
CARDIAC TROPONIN I PNL SERPL HS: 9 NG/L
CHLORIDE SERPL-SCNC: 102 MMOL/L (ref 96–108)
CK SERPL-CCNC: 26 U/L (ref 26–192)
CO2 SERPL-SCNC: 22 MMOL/L (ref 21–32)
CREAT SERPL-MCNC: 0.81 MG/DL (ref 0.6–1.3)
CRP SERPL QL: 161.7 MG/L
D DIMER PPP FEU-MCNC: 1.27 UG/ML FEU
EOSINOPHIL # BLD AUTO: 0.13 THOUSAND/ΜL (ref 0–0.61)
EOSINOPHIL NFR BLD AUTO: 1 % (ref 0–6)
ERYTHROCYTE [DISTWIDTH] IN BLOOD BY AUTOMATED COUNT: 12.9 % (ref 11.6–15.1)
FLUAV RNA RESP QL NAA+PROBE: NEGATIVE
FLUBV RNA RESP QL NAA+PROBE: NEGATIVE
GFR SERPL CREATININE-BSD FRML MDRD: 73 ML/MIN/1.73SQ M
GLUCOSE SERPL-MCNC: 129 MG/DL (ref 65–140)
HCT VFR BLD AUTO: 40.3 % (ref 34.8–46.1)
HGB BLD-MCNC: 13.6 G/DL (ref 11.5–15.4)
IMM GRANULOCYTES # BLD AUTO: 0.26 THOUSAND/UL (ref 0–0.2)
IMM GRANULOCYTES NFR BLD AUTO: 1 % (ref 0–2)
INR PPP: 1.03 (ref 0.84–1.19)
LACTATE SERPL-SCNC: 0.8 MMOL/L (ref 0.5–2)
LYMPHOCYTES # BLD AUTO: 1.11 THOUSANDS/ΜL (ref 0.6–4.47)
LYMPHOCYTES NFR BLD AUTO: 5 % (ref 14–44)
MAGNESIUM SERPL-MCNC: 2 MG/DL (ref 1.6–2.6)
MCH RBC QN AUTO: 31.1 PG (ref 26.8–34.3)
MCHC RBC AUTO-ENTMCNC: 33.7 G/DL (ref 31.4–37.4)
MCV RBC AUTO: 92 FL (ref 82–98)
MONOCYTES # BLD AUTO: 2.1 THOUSAND/ΜL (ref 0.17–1.22)
MONOCYTES NFR BLD AUTO: 10 % (ref 4–12)
NEUTROPHILS # BLD AUTO: 17.77 THOUSANDS/ΜL (ref 1.85–7.62)
NEUTS SEG NFR BLD AUTO: 83 % (ref 43–75)
NRBC BLD AUTO-RTO: 0 /100 WBCS
NT-PROBNP SERPL-MCNC: 347 PG/ML
PLATELET # BLD AUTO: 312 THOUSANDS/UL (ref 149–390)
PMV BLD AUTO: 9.1 FL (ref 8.9–12.7)
POTASSIUM SERPL-SCNC: 3.4 MMOL/L (ref 3.5–5.3)
PROCALCITONIN SERPL-MCNC: 6.63 NG/ML
PROT SERPL-MCNC: 7.4 G/DL (ref 6.4–8.4)
PROTHROMBIN TIME: 13.7 SECONDS (ref 11.6–14.5)
RBC # BLD AUTO: 4.38 MILLION/UL (ref 3.81–5.12)
RSV RNA RESP QL NAA+PROBE: NEGATIVE
S PYO DNA THROAT QL NAA+PROBE: NOT DETECTED
SARS-COV-2 RNA RESP QL NAA+PROBE: POSITIVE
SODIUM SERPL-SCNC: 136 MMOL/L (ref 135–147)
WBC # BLD AUTO: 21.44 THOUSAND/UL (ref 4.31–10.16)

## 2022-10-05 PROCEDURE — 83605 ASSAY OF LACTIC ACID: CPT | Performed by: EMERGENCY MEDICINE

## 2022-10-05 PROCEDURE — 86140 C-REACTIVE PROTEIN: CPT | Performed by: EMERGENCY MEDICINE

## 2022-10-05 PROCEDURE — 87040 BLOOD CULTURE FOR BACTERIA: CPT | Performed by: EMERGENCY MEDICINE

## 2022-10-05 PROCEDURE — 0241U HB NFCT DS VIR RESP RNA 4 TRGT: CPT | Performed by: EMERGENCY MEDICINE

## 2022-10-05 PROCEDURE — 87154 CUL TYP ID BLD PTHGN 6+ TRGT: CPT | Performed by: EMERGENCY MEDICINE

## 2022-10-05 PROCEDURE — 87077 CULTURE AEROBIC IDENTIFY: CPT | Performed by: EMERGENCY MEDICINE

## 2022-10-05 PROCEDURE — 99222 1ST HOSP IP/OBS MODERATE 55: CPT

## 2022-10-05 PROCEDURE — 96361 HYDRATE IV INFUSION ADD-ON: CPT

## 2022-10-05 PROCEDURE — 96365 THER/PROPH/DIAG IV INF INIT: CPT

## 2022-10-05 PROCEDURE — 85610 PROTHROMBIN TIME: CPT | Performed by: EMERGENCY MEDICINE

## 2022-10-05 PROCEDURE — 82550 ASSAY OF CK (CPK): CPT | Performed by: EMERGENCY MEDICINE

## 2022-10-05 PROCEDURE — 36415 COLL VENOUS BLD VENIPUNCTURE: CPT | Performed by: EMERGENCY MEDICINE

## 2022-10-05 PROCEDURE — 96375 TX/PRO/DX INJ NEW DRUG ADDON: CPT

## 2022-10-05 PROCEDURE — 85379 FIBRIN DEGRADATION QUANT: CPT | Performed by: EMERGENCY MEDICINE

## 2022-10-05 PROCEDURE — 84145 PROCALCITONIN (PCT): CPT

## 2022-10-05 PROCEDURE — 84145 PROCALCITONIN (PCT): CPT | Performed by: EMERGENCY MEDICINE

## 2022-10-05 PROCEDURE — 80053 COMPREHEN METABOLIC PANEL: CPT | Performed by: EMERGENCY MEDICINE

## 2022-10-05 PROCEDURE — 87186 SC STD MICRODIL/AGAR DIL: CPT | Performed by: EMERGENCY MEDICINE

## 2022-10-05 PROCEDURE — 84484 ASSAY OF TROPONIN QUANT: CPT | Performed by: EMERGENCY MEDICINE

## 2022-10-05 PROCEDURE — 99285 EMERGENCY DEPT VISIT HI MDM: CPT

## 2022-10-05 PROCEDURE — 83735 ASSAY OF MAGNESIUM: CPT | Performed by: EMERGENCY MEDICINE

## 2022-10-05 PROCEDURE — 93005 ELECTROCARDIOGRAM TRACING: CPT

## 2022-10-05 PROCEDURE — 87651 STREP A DNA AMP PROBE: CPT | Performed by: EMERGENCY MEDICINE

## 2022-10-05 PROCEDURE — 85025 COMPLETE CBC W/AUTO DIFF WBC: CPT | Performed by: EMERGENCY MEDICINE

## 2022-10-05 PROCEDURE — 71045 X-RAY EXAM CHEST 1 VIEW: CPT

## 2022-10-05 PROCEDURE — 83880 ASSAY OF NATRIURETIC PEPTIDE: CPT | Performed by: EMERGENCY MEDICINE

## 2022-10-05 PROCEDURE — 99285 EMERGENCY DEPT VISIT HI MDM: CPT | Performed by: EMERGENCY MEDICINE

## 2022-10-05 RX ORDER — CEFTRIAXONE 1 G/50ML
1000 INJECTION, SOLUTION INTRAVENOUS ONCE
Status: COMPLETED | OUTPATIENT
Start: 2022-10-05 | End: 2022-10-05

## 2022-10-05 RX ORDER — ACETAMINOPHEN 325 MG/1
650 TABLET ORAL ONCE
Status: COMPLETED | OUTPATIENT
Start: 2022-10-05 | End: 2022-10-05

## 2022-10-05 RX ORDER — ONDANSETRON 2 MG/ML
4 INJECTION INTRAMUSCULAR; INTRAVENOUS ONCE
Status: COMPLETED | OUTPATIENT
Start: 2022-10-05 | End: 2022-10-05

## 2022-10-05 RX ADMIN — SODIUM CHLORIDE 1000 ML: 0.9 INJECTION, SOLUTION INTRAVENOUS at 20:49

## 2022-10-05 RX ADMIN — ONDANSETRON 4 MG: 2 INJECTION INTRAMUSCULAR; INTRAVENOUS at 20:51

## 2022-10-05 RX ADMIN — CEFTRIAXONE 1000 MG: 1 INJECTION, SOLUTION INTRAVENOUS at 21:50

## 2022-10-05 RX ADMIN — ACETAMINOPHEN 650 MG: 325 TABLET ORAL at 20:52

## 2022-10-06 ENCOUNTER — APPOINTMENT (OUTPATIENT)
Dept: ULTRASOUND IMAGING | Facility: HOSPITAL | Age: 72
DRG: 871 | End: 2022-10-06
Payer: COMMERCIAL

## 2022-10-06 PROBLEM — N30.01 ACUTE CYSTITIS WITH HEMATURIA: Status: ACTIVE | Noted: 2022-10-06

## 2022-10-06 PROBLEM — A41.9 SEPSIS (HCC): Status: ACTIVE | Noted: 2022-10-06

## 2022-10-06 PROBLEM — A41.89 SEPSIS DUE TO COVID-19 (HCC): Status: ACTIVE | Noted: 2022-10-05

## 2022-10-06 LAB
ALBUMIN SERPL BCP-MCNC: 3 G/DL (ref 3.5–5)
ALP SERPL-CCNC: 136 U/L (ref 46–116)
ALT SERPL W P-5'-P-CCNC: 68 U/L (ref 12–78)
ANION GAP SERPL CALCULATED.3IONS-SCNC: 10 MMOL/L (ref 4–13)
AST SERPL W P-5'-P-CCNC: 28 U/L (ref 5–45)
ATRIAL RATE: 106 BPM
BACTERIA UR QL AUTO: ABNORMAL /HPF
BASOPHILS # BLD AUTO: 0.05 THOUSANDS/ΜL (ref 0–0.1)
BASOPHILS NFR BLD AUTO: 0 % (ref 0–1)
BILIRUB SERPL-MCNC: 0.58 MG/DL (ref 0.2–1)
BILIRUB UR QL STRIP: ABNORMAL
BUN SERPL-MCNC: 17 MG/DL (ref 5–25)
CALCIUM ALBUM COR SERPL-MCNC: 9.2 MG/DL (ref 8.3–10.1)
CALCIUM SERPL-MCNC: 8.4 MG/DL (ref 8.3–10.1)
CHLORIDE SERPL-SCNC: 104 MMOL/L (ref 96–108)
CLARITY UR: ABNORMAL
CO2 SERPL-SCNC: 24 MMOL/L (ref 21–32)
COLOR UR: YELLOW
CREAT SERPL-MCNC: 0.89 MG/DL (ref 0.6–1.3)
EOSINOPHIL # BLD AUTO: 0 THOUSAND/ΜL (ref 0–0.61)
EOSINOPHIL NFR BLD AUTO: 0 % (ref 0–6)
ERYTHROCYTE [DISTWIDTH] IN BLOOD BY AUTOMATED COUNT: 13 % (ref 11.6–15.1)
GFR SERPL CREATININE-BSD FRML MDRD: 65 ML/MIN/1.73SQ M
GLUCOSE SERPL-MCNC: 122 MG/DL (ref 65–140)
GLUCOSE UR STRIP-MCNC: NEGATIVE MG/DL
HCT VFR BLD AUTO: 38.5 % (ref 34.8–46.1)
HGB BLD-MCNC: 12.8 G/DL (ref 11.5–15.4)
HGB UR QL STRIP.AUTO: ABNORMAL
IMM GRANULOCYTES # BLD AUTO: 0.15 THOUSAND/UL (ref 0–0.2)
IMM GRANULOCYTES NFR BLD AUTO: 1 % (ref 0–2)
KETONES UR STRIP-MCNC: ABNORMAL MG/DL
LEUKOCYTE ESTERASE UR QL STRIP: ABNORMAL
LYMPHOCYTES # BLD AUTO: 1.8 THOUSANDS/ΜL (ref 0.6–4.47)
LYMPHOCYTES NFR BLD AUTO: 9 % (ref 14–44)
MCH RBC QN AUTO: 30.9 PG (ref 26.8–34.3)
MCHC RBC AUTO-ENTMCNC: 33.2 G/DL (ref 31.4–37.4)
MCV RBC AUTO: 93 FL (ref 82–98)
MONOCYTES # BLD AUTO: 1.14 THOUSAND/ΜL (ref 0.17–1.22)
MONOCYTES NFR BLD AUTO: 6 % (ref 4–12)
NEUTROPHILS # BLD AUTO: 17.75 THOUSANDS/ΜL (ref 1.85–7.62)
NEUTS SEG NFR BLD AUTO: 84 % (ref 43–75)
NITRITE UR QL STRIP: POSITIVE
NON-SQ EPI CELLS URNS QL MICRO: ABNORMAL /HPF
NRBC BLD AUTO-RTO: 0 /100 WBCS
P AXIS: 66 DEGREES
PH UR STRIP.AUTO: 6 [PH]
PLATELET # BLD AUTO: 302 THOUSANDS/UL (ref 149–390)
PMV BLD AUTO: 9 FL (ref 8.9–12.7)
POTASSIUM SERPL-SCNC: 3.7 MMOL/L (ref 3.5–5.3)
PR INTERVAL: 198 MS
PROCALCITONIN SERPL-MCNC: 7.07 NG/ML
PROCALCITONIN SERPL-MCNC: 9.96 NG/ML
PROT SERPL-MCNC: 7 G/DL (ref 6.4–8.4)
PROT UR STRIP-MCNC: ABNORMAL MG/DL
QRS AXIS: 80 DEGREES
QRSD INTERVAL: 110 MS
QT INTERVAL: 348 MS
QTC INTERVAL: 462 MS
RBC # BLD AUTO: 4.14 MILLION/UL (ref 3.81–5.12)
RBC #/AREA URNS AUTO: ABNORMAL /HPF
SODIUM SERPL-SCNC: 138 MMOL/L (ref 135–147)
SP GR UR STRIP.AUTO: 1.02 (ref 1–1.03)
T WAVE AXIS: 35 DEGREES
UROBILINOGEN UR QL STRIP.AUTO: 0.2 E.U./DL
VENTRICULAR RATE: 106 BPM
WBC # BLD AUTO: 20.89 THOUSAND/UL (ref 4.31–10.16)
WBC #/AREA URNS AUTO: ABNORMAL /HPF

## 2022-10-06 PROCEDURE — 76775 US EXAM ABDO BACK WALL LIM: CPT

## 2022-10-06 PROCEDURE — 87077 CULTURE AEROBIC IDENTIFY: CPT | Performed by: EMERGENCY MEDICINE

## 2022-10-06 PROCEDURE — 84145 PROCALCITONIN (PCT): CPT

## 2022-10-06 PROCEDURE — 80053 COMPREHEN METABOLIC PANEL: CPT

## 2022-10-06 PROCEDURE — 87086 URINE CULTURE/COLONY COUNT: CPT | Performed by: EMERGENCY MEDICINE

## 2022-10-06 PROCEDURE — 99232 SBSQ HOSP IP/OBS MODERATE 35: CPT | Performed by: FAMILY MEDICINE

## 2022-10-06 PROCEDURE — 87186 SC STD MICRODIL/AGAR DIL: CPT | Performed by: EMERGENCY MEDICINE

## 2022-10-06 PROCEDURE — XW033E5 INTRODUCTION OF REMDESIVIR ANTI-INFECTIVE INTO PERIPHERAL VEIN, PERCUTANEOUS APPROACH, NEW TECHNOLOGY GROUP 5: ICD-10-PCS | Performed by: FAMILY MEDICINE

## 2022-10-06 PROCEDURE — 85025 COMPLETE CBC W/AUTO DIFF WBC: CPT

## 2022-10-06 PROCEDURE — 81001 URINALYSIS AUTO W/SCOPE: CPT | Performed by: EMERGENCY MEDICINE

## 2022-10-06 RX ORDER — CALCIUM CARBONATE 200(500)MG
1000 TABLET,CHEWABLE ORAL DAILY PRN
Status: DISCONTINUED | OUTPATIENT
Start: 2022-10-06 | End: 2022-10-07 | Stop reason: HOSPADM

## 2022-10-06 RX ORDER — BENZONATATE 100 MG/1
100 CAPSULE ORAL 3 TIMES DAILY PRN
Status: DISCONTINUED | OUTPATIENT
Start: 2022-10-06 | End: 2022-10-07 | Stop reason: HOSPADM

## 2022-10-06 RX ORDER — ONDANSETRON 2 MG/ML
4 INJECTION INTRAMUSCULAR; INTRAVENOUS EVERY 6 HOURS PRN
Status: DISCONTINUED | OUTPATIENT
Start: 2022-10-06 | End: 2022-10-07 | Stop reason: HOSPADM

## 2022-10-06 RX ORDER — AMLODIPINE BESYLATE 5 MG/1
5 TABLET ORAL DAILY
Status: DISCONTINUED | OUTPATIENT
Start: 2022-10-06 | End: 2022-10-07 | Stop reason: HOSPADM

## 2022-10-06 RX ORDER — LEVOTHYROXINE SODIUM 112 UG/1
112 TABLET ORAL
Status: DISCONTINUED | OUTPATIENT
Start: 2022-10-06 | End: 2022-10-07 | Stop reason: HOSPADM

## 2022-10-06 RX ORDER — ASPIRIN 81 MG/1
81 TABLET ORAL DAILY
Status: DISCONTINUED | OUTPATIENT
Start: 2022-10-06 | End: 2022-10-07 | Stop reason: HOSPADM

## 2022-10-06 RX ORDER — LANOLIN ALCOHOL/MO/W.PET/CERES
3 CREAM (GRAM) TOPICAL
Status: DISCONTINUED | OUTPATIENT
Start: 2022-10-06 | End: 2022-10-07 | Stop reason: HOSPADM

## 2022-10-06 RX ORDER — ACETAMINOPHEN 325 MG/1
650 TABLET ORAL EVERY 6 HOURS PRN
Status: DISCONTINUED | OUTPATIENT
Start: 2022-10-06 | End: 2022-10-07 | Stop reason: HOSPADM

## 2022-10-06 RX ORDER — CEFTRIAXONE 1 G/50ML
1000 INJECTION, SOLUTION INTRAVENOUS EVERY 24 HOURS
Status: DISCONTINUED | OUTPATIENT
Start: 2022-10-06 | End: 2022-10-07 | Stop reason: HOSPADM

## 2022-10-06 RX ORDER — ATORVASTATIN CALCIUM 40 MG/1
40 TABLET, FILM COATED ORAL
Status: DISCONTINUED | OUTPATIENT
Start: 2022-10-06 | End: 2022-10-07 | Stop reason: HOSPADM

## 2022-10-06 RX ORDER — HEPARIN SODIUM 5000 [USP'U]/ML
5000 INJECTION, SOLUTION INTRAVENOUS; SUBCUTANEOUS EVERY 8 HOURS SCHEDULED
Status: DISCONTINUED | OUTPATIENT
Start: 2022-10-06 | End: 2022-10-07 | Stop reason: HOSPADM

## 2022-10-06 RX ADMIN — LEVOTHYROXINE SODIUM 112 MCG: 112 TABLET ORAL at 05:35

## 2022-10-06 RX ADMIN — ATORVASTATIN CALCIUM 40 MG: 40 TABLET, FILM COATED ORAL at 15:37

## 2022-10-06 RX ADMIN — CEFTRIAXONE 1000 MG: 1 INJECTION, SOLUTION INTRAVENOUS at 21:10

## 2022-10-06 RX ADMIN — REMDESIVIR 200 MG: 100 INJECTION, POWDER, LYOPHILIZED, FOR SOLUTION INTRAVENOUS at 15:27

## 2022-10-06 RX ADMIN — ACETAMINOPHEN 650 MG: 325 TABLET ORAL at 10:29

## 2022-10-06 RX ADMIN — HEPARIN SODIUM 5000 UNITS: 5000 INJECTION INTRAVENOUS; SUBCUTANEOUS at 21:09

## 2022-10-06 RX ADMIN — MELATONIN 3 MG: 3 TAB ORAL at 21:21

## 2022-10-06 RX ADMIN — HEPARIN SODIUM 5000 UNITS: 5000 INJECTION INTRAVENOUS; SUBCUTANEOUS at 05:35

## 2022-10-06 RX ADMIN — Medication 1 TABLET: at 09:33

## 2022-10-06 RX ADMIN — AMLODIPINE BESYLATE 5 MG: 5 TABLET ORAL at 09:33

## 2022-10-06 RX ADMIN — ASPIRIN 81 MG: 81 TABLET, COATED ORAL at 09:33

## 2022-10-06 RX ADMIN — HEPARIN SODIUM 5000 UNITS: 5000 INJECTION INTRAVENOUS; SUBCUTANEOUS at 15:25

## 2022-10-06 NOTE — UTILIZATION REVIEW
Initial Clinical Review    Admission: Date/Time/Statement:   Admission Orders (From admission, onward)     Ordered        10/05/22 2219  INPATIENT ADMISSION  Once                      Orders Placed This Encounter   Procedures    INPATIENT ADMISSION     Standing Status:   Standing     Number of Occurrences:   1     Order Specific Question:   Level of Care     Answer:   Med Surg [16]     Order Specific Question:   Estimated length of stay     Answer:   More than 2 Midnights     Order Specific Question:   Certification     Answer:   I certify that inpatient services are medically necessary for this patient for a duration of greater than two midnights  See H&P and MD Progress Notes for additional information about the patient's course of treatment  ED Arrival Information     Expected   10/5/2022     Arrival   10/5/2022 20:13    Acuity   Urgent            Means of arrival   Walk-In    Escorted by   Family Member    Service   Hospitalist    Admission type   Emergency            Arrival complaint   sore throat, fever, vomiting           Chief Complaint   Patient presents with    Flu Symptoms     Pt sent here by urgent care, per patient they had a sore throat last week, now developing fatigue, weakness, vomiting and fevers        Initial Presentation: 81 y o  female with PMhx of HTN, HLD, hypothyroidism presents to ED as a walk-in with sore throat, malaise, low grade fever and chills x 1 wk  In ED pt tachycardic  WBC 21 44, K 3 4, T bili 1 21, Alk phos 147, ALT 79  Procal 6 63  tested covid pos in ED  Pt reports she is vac x3  Admit inpatient to M/S/Tele unit with Covid-19 and Sepsis  Currently with no respir complaints, maintaining O2 sat on room air  Rocephin, f/u blood cxs  Incentive spirometry  Encourage prone postioning  Trend procal and CMP  Hep sq  Date: 10/6  Day 2: Pt with c/o headache  Remains tachycardic  Remains on room air  CXR neg for PNA  Continue rocephin  Remdesivir started today   Continue supportive care, encourage prone , incentive spirometry  SCDs  Kidney US normal         ED Triage Vitals   Temperature Pulse Respirations Blood Pressure SpO2   10/05/22 2024 10/05/22 2024 10/05/22 2024 10/05/22 2024 10/05/22 2024   100 °F (37 8 °C) (!) 121 20 134/62 94 %      Temp Source Heart Rate Source Patient Position - Orthostatic VS BP Location FiO2 (%)   10/05/22 2024 10/05/22 2024 10/05/22 2024 10/05/22 2024 --   Temporal Monitor Sitting Left arm       Pain Score       10/05/22 2052       Med Not Given for Pain - for MAR use only          Wt Readings from Last 1 Encounters:   10/05/22 77 5 kg (170 lb 12 8 oz)     Additional Vital Signs:   Date/Time Temp Pulse Resp BP MAP (mmHg) SpO2 O2 Device   10/06/22 09:34:51 99 °F (37 2 °C) 106 Abnormal  18 121/65 84 96 % None (Room air)   10/06/22 0933 -- -- -- 121/65 -- -- --   10/06/22 06:53:20 99 3 °F (37 4 °C) 101 18 158/61 93 96 % --   10/05/22 23:04:16 98 8 °F (37 1 °C) 98 18 107/64 78 93 % --   10/05/22 2300 -- -- -- -- -- 93 % None (Room air)   10/05/22 2230 -- 101 18 115/59 81 94 % --   10/05/22 2200 -- 104 18 120/62 83 95 % --   10/05/22 2149 99 3 °F (37 4 °C) -- -- -- -- -- --   10/05/22 2130 -- 103 18 111/56 80 94 % --   10/05/22 2100 -- 107 Abnormal  18 117/57 82 94 % --   10/05/22 2024 100 °F (37 8 °C) 121 Abnormal  20 134/62 -- 94 % None (Room air)       Pertinent Labs/Diagnostic Test Results:   EKG 10/5: Sinus tachycardia  Tracing otherwise unremarkable    XR chest 1 view portable   Final Result by Carrie Rodriguez MD (10/06 1202)      No acute cardiopulmonary disease           US kidney and bladder 10/6: Normal       Results from last 7 days   Lab Units 10/05/22  2044   SARS-COV-2  Positive*     Results from last 7 days   Lab Units 10/06/22  0541 10/05/22  2047   WBC Thousand/uL 20 89* 21 44*   HEMOGLOBIN g/dL 12 8 13 6   HEMATOCRIT % 38 5 40 3   PLATELETS Thousands/uL 302 312   NEUTROS ABS Thousands/µL 17 75* 17 77*     Results from last 7 days Lab Units 10/06/22  0541 10/05/22  2047   SODIUM mmol/L 138 136   POTASSIUM mmol/L 3 7 3 4*   CHLORIDE mmol/L 104 102   CO2 mmol/L 24 22   ANION GAP mmol/L 10 12   BUN mg/dL 17 15   CREATININE mg/dL 0 89 0 81   EGFR ml/min/1 73sq m 65 73   CALCIUM mg/dL 8 4 9 0   MAGNESIUM mg/dL  --  2 0     Results from last 7 days   Lab Units 10/06/22  0541 10/05/22  2047   AST U/L 28 42   ALT U/L 68 79*   ALK PHOS U/L 136* 147*   TOTAL PROTEIN g/dL 7 0 7 4   ALBUMIN g/dL 3 0* 3 3*   TOTAL BILIRUBIN mg/dL 0 58 1 21*     Results from last 7 days   Lab Units 10/06/22  0541 10/05/22  2047   GLUCOSE RANDOM mg/dL 122 129     Results from last 7 days   Lab Units 10/05/22  2047   CK TOTAL U/L 26     Results from last 7 days   Lab Units 10/05/22  2047   HS TNI 0HR ng/L 9     Results from last 7 days   Lab Units 10/05/22  2047   D-DIMER QUANTITATIVE ug/ml FEU 1 27*     Results from last 7 days   Lab Units 10/05/22  2047   PROTIME seconds 13 7   INR  1 03     Results from last 7 days   Lab Units 10/06/22  0541 10/05/22  2047   PROCALCITONIN ng/ml 9 96* 7 07*  6 63*     Results from last 7 days   Lab Units 10/05/22  2047   LACTIC ACID mmol/L 0 8     Results from last 7 days   Lab Units 10/05/22  2047   NT-PRO BNP pg/mL 347*     Results from last 7 days   Lab Units 10/05/22  2047   CRP mg/L 161 7*       Results from last 7 days   Lab Units 10/06/22  0011   CLARITY UA  Cloudy   COLOR UA  Yellow   SPEC GRAV UA  1 025   PH UA  6 0   GLUCOSE UA mg/dl Negative   KETONES UA mg/dl 40 (2+)*   BLOOD UA  Large*   PROTEIN UA mg/dl 30 (1+)*   NITRITE UA  Positive*   BILIRUBIN UA  Small*   UROBILINOGEN UA E U /dl 0 2   LEUKOCYTES UA  Moderate*   WBC UA /hpf 30-50*   RBC UA /hpf 2-4   BACTERIA UA /hpf Occasional   EPITHELIAL CELLS WET PREP /hpf Occasional     Results from last 7 days   Lab Units 10/05/22  2044   INFLUENZA A PCR  Negative   INFLUENZA B PCR  Negative   RSV PCR  Negative     Results from last 7 days   Lab Units 10/05/22  2047   BLOOD CULTURE  Received in Microbiology Lab  Culture in Progress  Received in Microbiology Lab  Culture in Progress         ED Treatment:   Medication Administration from 10/05/2022 1943 to 10/05/2022 2246       Date/Time Order Dose Route Action     10/05/2022 2049 sodium chloride 0 9 % bolus 1,000 mL 1,000 mL Intravenous New Bag     10/05/2022 2051 ondansetron [facility]) injection 4 mg 4 mg Intravenous Given     10/05/2022 2052 acetaminophen (TYLENOL) tablet 650 mg 650 mg Oral Given     10/05/2022 2150 cefTRIAXone (ROCEPHIN) IVPB (premix in dextrose) 1,000 mg 50 mL 1,000 mg Intravenous New Bag     Past Medical History:   Diagnosis Date    Disease of thyroid gland     High cholesterol     Hypertension        Admitting Diagnosis: Pneumonia [J18 9]  Nausea and vomiting [R11 2]  Flu-like symptoms [R68 89]  COVID-19 [U07 1]  Age/Sex: 81 y o  female  Admission Orders:  Scheduled Medications:  amLODIPine, 5 mg, Oral, Daily  aspirin, 81 mg, Oral, Daily  atorvastatin, 40 mg, Oral, Daily With Dinner  cefTRIAXone, 1,000 mg, Intravenous, Q24H  heparin (porcine), 5,000 Units, Subcutaneous, Q8H [facility]  levothyroxine, 112 mcg, Oral, Early Morning  multivitamin-minerals, 1 tablet, Oral, Daily    remdesivir (Veklury) 200 mg in sodium chloride 0 9 % 290 mL IVPB  Dose: 200 mg  Freq: Every 24 hours Route: IV  Indications of Use: INFECTION CAUSED BY 2019 NOVEL CORONAVIRUS  Start: 10/06/22 1430 End: 10/07/22 1429        Followed by  remdesivir Ferol Kate) 100 mg in sodium chloride 0 9 % 270 mL IVPB  Dose: 100 mg  Freq: Every 24 hours Route: IV  Indications of Use: INFECTION CAUSED BY 2019 NOVEL CORONAVIRUS  Start: 10/07/22 1430 End: 10/11/22 1429    PRN Meds:  acetaminophen, 650 mg, Oral, Q6H PRN 10/6 x1  benzonatate, 100 mg, Oral, TID PRN  calcium carbonate, 1,000 mg, Oral, Daily PRN  [START ON 10/7/2022] influenza vaccine, 0 7 mL, Intramuscular, Prior to discharge  ondansetron, 4 mg, Intravenous, Q6H PRN        Network Utilization Review Department  ATTENTION: Please call with any questions or concerns to [phone] and carefully listen to the prompts so that you are directed to the right person  All voicemails are confidential   Ty Limb all requests for admission clinical reviews, approved or denied determinations and any other requests to dedicated fax number below belonging to the campus where the patient is receiving treatment   List of dedicated fax numbers for the Facilities:  [address] DENIALS (Administrative/Medical Necessity) [phone]   [address] (Maternity/NICU/Pediatrics) [phone]   [address]  [address] [facility] [place] [id number] [place] [address] [address] [place] [address] [phone]

## 2022-10-06 NOTE — H&P
[facility] 1950, 81 y o  female MRN: [id number]  Unit/Bed#: -01 Encounter: [id number]  Primary Care Provider: William Brandon MD   Date and time admitted to hospital: 10/5/2022  8:19 PM    * COVID-19  Assessment & Plan  · Presents from home with sore throat , chills, malaise for over 1 week  In ED tested COVID + 10/5/22  · Reports COVID vaccinated x 3    · + sepsis criteria   · XR chest pending final read  · Admit to mild COVID pathway, currently on room air  · D-dimer 1 27, age appropriate   · No respiratory complaints, will hold off on further imaging   · DVT ppx Heparin SQ   · Continue Rocephin   · Continue supportive care, encourage prone , incentive spirometry     Sepsis ([facility] [address] )  Assessment & Plan  · Sepsis criteria present on admission as evidenced by tachycardia, leukocytosis WBC 21 4, COVID+, UA abnormal   · Multifactorial in the setting of COVID 19 and acute cystitis  · Lactate normal  Procalcitonin 6 63  · F/U blood and urine cultures- Continue Rocephin   · Trend CBC, procalcitonin     Nausea  Assessment & Plan  · Reported episode of nausea and vomiting day of admission   No abdominal pain, tolerating PO intake now  · Likely secondary to acute infection (COVID 19, acute cystitis )  · Mildly abnormal LFT's, could be reactive in setting of COVID 19 as well  · Consider CT abd/pelvis if persists   · Trend CMP    Acute cystitis with hematuria  Assessment & Plan  · UA abnormal showing nitrates, pyuria   · Follow up urine culture, continue Rocephin     Hypokalemia  Assessment & Plan  · K 3 4 , repleted  · Repeat labs     Hypertension  Assessment & Plan  · BP's stable  · Continue Norvasc    VTE Pharmacologic Prophylaxis: VTE Score: 4 Moderate Risk (Score 3-4) - Pharmacological DVT Prophylaxis Ordered: heparin  Code Status: Level 1 - Full Code   Discussion with family: Patient declined call to        Anticipated Length of Stay: Patient will be admitted on an inpatient basis with an anticipated length of stay of greater than 2 midnights secondary to covid 19 , sepsis- iv abx   Total Time for Visit, including Counseling / Coordination of Care: 70 minutes Greater than 50% of this total time spent on direct patient counseling and coordination of care  Chief Complaint: malaise, chills, sore throat    History of Present Illness:  Jennifer Wade is a 81 y o  female with a PMH of hypertension , hypothyroid hyperlipidemia who presents sore throat, malaise, chills for over 1 week  Patient reports 1 week prior started with a sore throat, then developed chills and malaise yesterday  Reports low-grade fever at home  Reports episode of nausea and vomiting earlier, has resolved per patient  Patient reports she is vaccinated x3  Denies chest pain, shortness of breath, abdominal pain, diarrhea  Review of Systems:  Review of Systems   Constitutional: Positive for activity change, chills and fever  HENT: Positive for sore throat  Respiratory: Negative for cough and shortness of breath  Cardiovascular: Negative for chest pain and leg swelling  Gastrointestinal: Positive for nausea and vomiting  Negative for abdominal pain and diarrhea  Genitourinary: Negative for difficulty urinating, dysuria and flank pain  Musculoskeletal: Negative for arthralgias and back pain  Neurological: Negative for dizziness, weakness, numbness and headaches  All other systems reviewed and are negative  Past Medical and Surgical History:   Past Medical History:   Diagnosis Date    Disease of thyroid gland     High cholesterol     Hypertension        Past Surgical History:   Procedure Laterality Date    HYSTERECTOMY      age 52    OOPHORECTOMY Bilateral     age 52    [address] Right 03/16/2022    benign       Meds/Allergies:  Prior to Admission medications    Medication Sig Start Date End Date Taking?  Authorizing Provider   amLODIPine (NORVASC) 5 mg tablet  9/16/21  Yes Historical Provider, MD   aspirin (ECOTRIN LOW STRENGTH) 81 mg EC tablet Take by mouth   Yes Historical Provider, MD   atorvastatin (LIPITOR) 40 mg tablet    Yes Historical Provider, MD   Cholecalciferol (Vitamin D3) 25 MCG (1000 UT) CAPS    Yes Historical Provider, MD   co-enzyme Q-10 100 mg capsule Take by mouth   Yes Historical Provider, MD   ibandronate (BONIVA) 150 MG tablet Take 1 tablet by mouth   Yes Historical Provider, MD   levothyroxine 112 mcg tablet  8/5/21  Yes Historical Provider, MD   Multiple Vitamin (MULTIVITAMIN PO)    Yes Historical Provider, MD   benazepril-hydrochlorthiazide (LOTENSIN HCT) 10-12 5 MG per tablet benazepril 10 mg-hydrochlorothiazide 12 5 mg tablet  Patient not taking: Reported on 10/6/2022    Historical Provider, MD     I have reviewed home medications with patient personally      Allergies: No Known Allergies    Social History:  Marital Status: Single     Patient Pre-hospital Living Situation: Home  Patient Pre-hospital Level of Mobility: walks  Patient Pre-hospital Diet Restrictions: none  Substance Use History:   Social History     Substance and Sexual Activity   Alcohol Use Yes    Comment: occasionally     Social History     Tobacco Use   Smoking Status Never Smoker   Smokeless Tobacco Never Used     Social History     Substance and Sexual Activity   Drug Use Never       Family History:  Family History   Problem Relation Age of Onset    No Known Problems Mother     No Known Problems Father     No Known Problems Daughter     No Known Problems Maternal Grandmother     No Known Problems Maternal Grandfather     No Known Problems Paternal Grandmother     No Known Problems Paternal Grandfather     No Known Problems Daughter     No Known Problems Maternal Aunt     No Known Problems Paternal Aunt     No Known Problems Paternal Aunt     No Known Problems Paternal Aunt     No Known Problems Paternal Aunt     No Known Problems Paternal Aunt     No Known Problems Paternal Aunt     BRCA2 Positive Neg Hx     BRCA2 Negative Neg Hx     BRCA1 Negative Neg Hx     BRCA1 Positive Neg Hx     BRCA 1/2 Neg Hx     Ovarian cancer Neg Hx     Endometrial cancer Neg Hx     Colon cancer Neg Hx     Breast cancer additional onset Neg Hx     Breast cancer Neg Hx        Physical Exam:     Vitals:   Blood Pressure: 107/64 (10/05/22 2304)  Pulse: 98 (10/05/22 2304)  Temperature: 98 8 °F (37 1 °C) (10/05/22 2304)  Temp Source: Temporal (10/05/22 2149)  Respirations: 18 (10/05/22 2304)  Height: 5' 3" (160 cm) (10/05/22 2302)  Weight - Scale: 77 5 kg (170 lb 12 8 oz) (10/05/22 2302)  SpO2: 93 % (10/05/22 2304)    Physical Exam  Vitals and nursing note reviewed  Constitutional:       General: She is not in acute distress  Appearance: Normal appearance  She is not ill-appearing, toxic-appearing or diaphoretic  HENT:      Ears:      Comments: Hard of hearing  Cardiovascular:      Rate and Rhythm: Normal rate and regular rhythm  Pulses: Normal pulses  Heart sounds: Murmur heard  Pulmonary:      Effort: Pulmonary effort is normal  No respiratory distress  Breath sounds: Normal breath sounds  No wheezing, rhonchi or rales  Comments: Diminished throughout  Abdominal:      General: Bowel sounds are normal  There is no distension  Palpations: Abdomen is soft  Tenderness: There is no abdominal tenderness  There is no guarding or rebound  Musculoskeletal:         General: Normal range of motion  Cervical back: Normal range of motion  Right lower leg: No edema  Left lower leg: No edema  Skin:     General: Skin is warm  Neurological:      General: No focal deficit present  Mental Status: She is alert and oriented to person, place, and time            Additional Data:     Lab Results:  Results from last 7 days   Lab Units 10/05/22  2047   WBC Thousand/uL 21 44*   HEMOGLOBIN g/dL 13 6 HEMATOCRIT % 40 3   PLATELETS Thousands/uL 312   NEUTROS PCT % 83*   LYMPHS PCT % 5*   MONOS PCT % 10   EOS PCT % 1     Results from last 7 days   Lab Units 10/05/22  2047   SODIUM mmol/L 136   POTASSIUM mmol/L 3 4*   CHLORIDE mmol/L 102   CO2 mmol/L 22   BUN mg/dL 15   CREATININE mg/dL 0 81   ANION GAP mmol/L 12   CALCIUM mg/dL 9 0   ALBUMIN g/dL 3 3*   TOTAL BILIRUBIN mg/dL 1 21*   ALK PHOS U/L 147*   ALT U/L 79*   AST U/L 42   GLUCOSE RANDOM mg/dL 129     Results from last 7 days   Lab Units 10/05/22  2047   INR  1 03             Results from last 7 days   Lab Units 10/05/22  2047   LACTIC ACID mmol/L 0 8   PROCALCITONIN ng/ml 6 63*       Imaging: Personally reviewed the following imaging: chest xray  XR chest 1 view portable    (Results Pending)       EKG and Other Studies Reviewed on Admission:   · EKG: No EKG obtained  not available in muse    ** Please Note: This note has been constructed using a voice recognition system   **

## 2022-10-06 NOTE — ASSESSMENT & PLAN NOTE
· Reported episode of nausea and vomiting day of admission   No abdominal pain, tolerating PO intake now  · Likely secondary to acute infection (COVID 19, acute cystitis )  · Mildly abnormal LFT's, could be reactive in setting of COVID 19 as well     · Consider CT abd/pelvis if persists   · Trend CMP

## 2022-10-06 NOTE — ED PROVIDER NOTES
History  Chief Complaint   Patient presents with    Flu Symptoms     Pt sent here by urgent care, per patient they had a sore throat last week, now developing fatigue, weakness, vomiting and fevers      Patient is a 82-year-old female presenting to the emergency department complaining fever, chills, body aches and fatigue, cough and congestion, nausea and vomiting, symptoms began about a week ago with a sore throat, she intermittently improved but started develop worsening symptoms yesterday, she denies any injury, her fiance has some similar symptoms as well, she denies any chest pain, no abdominal pain, no dysuria or hematuria, reports that however daughter administered a COVID test today and it was negative, patient attempted to take Tylenol at home but was unable to secondary to vomiting          Prior to Admission Medications   Prescriptions Last Dose Informant Patient Reported? Taking?    Cholecalciferol (Vitamin D3) 25 MCG (1000 UT) CAPS   Yes No   Multiple Vitamin (MULTIVITAMIN PO)   Yes No   amLODIPine (NORVASC) 5 mg tablet   Yes No   aspirin (Aspir-Low) 81 mg EC tablet   Yes No   Sig: Take by mouth   atorvastatin (LIPITOR) 40 mg tablet   Yes No   benazepril-hydrochlorthiazide (LOTENSIN HCT) 10-12 5 MG per tablet   Yes No   Sig: benazepril 10 mg-hydrochlorothiazide 12 5 mg tablet   co-enzyme Q-10 100 mg capsule   Yes No   Sig: Take by mouth   ibandronate (Boniva) 150 MG tablet   Yes No   Sig: Take 1 tablet by mouth   levothyroxine 112 mcg tablet   Yes No      Facility-Administered Medications: None       Past Medical History:   Diagnosis Date    Disease of thyroid gland     High cholesterol     Hypertension        Past Surgical History:   Procedure Laterality Date    HYSTERECTOMY      age 52   [place]agi  OOPHORECTOMY Bilateral     age 52    [address] Right 03/16/2022    benign       Family History   Problem Relation Age of Onset    No Known Problems Mother     No Known Problems Father     No Known Problems Daughter     No Known Problems Maternal Grandmother     No Known Problems Maternal Grandfather     No Known Problems Paternal Grandmother     No Known Problems Paternal Grandfather     No Known Problems Daughter     No Known Problems Maternal Aunt     No Known Problems Paternal Aunt     No Known Problems Paternal Aunt     No Known Problems Paternal Aunt     No Known Problems Paternal Aunt     No Known Problems Paternal Aunt     No Known Problems Paternal Aunt     BRCA2 Positive Neg Hx     BRCA2 Negative Neg Hx     BRCA1 Negative Neg Hx     BRCA1 Positive Neg Hx     BRCA 1/2 Neg Hx     Ovarian cancer Neg Hx     Endometrial cancer Neg Hx     Colon cancer Neg Hx     Breast cancer additional onset Neg Hx     Breast cancer Neg Hx      I have reviewed and agree with the history as documented  E-Cigarette/Vaping    E-Cigarette Use Never User      E-Cigarette/Vaping Substances     Social History     Tobacco Use    Smoking status: Never Smoker    Smokeless tobacco: Never Used   Vaping Use    Vaping Use: Never used   Substance Use Topics    Alcohol use: Yes     Comment: occasionally    Drug use: Never       Review of Systems   Constitutional: Positive for chills, fatigue and fever  HENT: Positive for congestion  Eyes: Negative  Respiratory: Positive for cough  Cardiovascular: Negative  Gastrointestinal: Positive for vomiting  Endocrine: Negative  Genitourinary: Negative  Musculoskeletal: Positive for myalgias  Skin: Negative  Allergic/Immunologic: Negative  Neurological: Negative  Hematological: Negative  Psychiatric/Behavioral: Negative  Physical Exam  Physical Exam  Constitutional:       General: She is not in acute distress  Appearance: She is well-developed  She is ill-appearing  HENT:      Head: Normocephalic and atraumatic  Mouth/Throat:      Mouth: Mucous membranes are dry     Eyes:      Conjunctiva/sclera: Conjunctivae normal       Pupils: Pupils are equal, round, and reactive to light  Cardiovascular:      Rate and Rhythm: Tachycardia present  Heart sounds: Normal heart sounds  Pulmonary:      Effort: Pulmonary effort is normal       Breath sounds: Normal breath sounds  Abdominal:      Palpations: Abdomen is soft  Musculoskeletal:         General: Normal range of motion  Cervical back: Normal range of motion and neck supple  Skin:     General: Skin is warm and dry  Neurological:      Mental Status: She is alert and oriented to person, place, and time  Vital Signs  ED Triage Vitals   Temperature Pulse Respirations Blood Pressure SpO2   10/05/22 2024 10/05/22 2024 10/05/22 2024 10/05/22 2024 10/05/22 2024   100 °F (37 8 °C) (!) 121 20 134/62 94 %      Temp Source Heart Rate Source Patient Position - Orthostatic VS BP Location FiO2 (%)   10/05/22 2024 10/05/22 2024 10/05/22 2024 10/05/22 2024 --   Temporal Monitor Sitting Left arm       Pain Score       10/05/22 2052       Med Not Given for Pain - for MAR use only           Vitals:    10/05/22 2024 10/05/22 2100 10/05/22 2130 10/05/22 2200   BP: 134/62 117/57 111/56 120/62   Pulse: (!) 121 (!) 107 103 104   Patient Position - Orthostatic VS: Sitting                  ED Medications  Medications   sodium chloride 0 9 % bolus 1,000 mL (0 mL Intravenous Stopped 10/5/22 2149)   ondansetron (ZOFRAN) injection 4 mg (4 mg Intravenous Given 10/5/22 2051)   acetaminophen (TYLENOL) tablet 650 mg (650 mg Oral Given 10/5/22 2052)   cefTRIAXone (ROCEPHIN) IVPB (premix in dextrose) 1,000 mg 50 mL (0 mg Intravenous Stopped 10/5/22 2220)       Diagnostic Studies  Results Reviewed     Procedure Component Value Units Date/Time    Strep A PCR [[id number]] Collected: 10/05/22 2152    Lab Status:  In process Specimen: Throat Updated: 10/05/22 2154    UA w Reflex to Microscopic w Reflex to Culture [[id number]]     Lab Status: No result Specimen: Urine FLU/RSV/COVID - if FLU/RSV clinically relevant [[id number]]  (Abnormal) Collected: 10/05/22 2044    Lab Status: Final result Specimen: Nares from Nose Updated: 10/05/22 2136     SARS-CoV-2 Positive     INFLUENZA A PCR Negative     INFLUENZA B PCR Negative     RSV PCR Negative    Narrative:      FOR PEDIATRIC PATIENTS - copy/paste COVID Guidelines URL to browser: [email or web address]    SARS-CoV-2 assay is a Nucleic Acid Amplification assay intended for the  qualitative detection of nucleic acid from SARS-CoV-2 in nasopharyngeal  swabs  Results are for the presumptive identification of SARS-CoV-2 RNA  Positive results are indicative of infection with SARS-CoV-2, the virus  causing COVID-19, but do not rule out bacterial infection or co-infection  with other viruses  Laboratories within the [place] and its  territories are required to report all positive results to the appropriate  public health authorities  Negative results do not preclude SARS-CoV-2  infection and should not be used as the sole basis for treatment or other  patient management decisions  Negative results must be combined with  clinical observations, patient history, and epidemiological information  This test has not been FDA cleared or approved  This test has been authorized by FDA under an Emergency Use Authorization  (EUA)  This test is only authorized for the duration of time the  declaration that circumstances exist justifying the authorization of the  emergency use of an in vitro diagnostic tests for detection of SARS-CoV-2  virus and/or diagnosis of COVID-19 infection under section 564(b)(1) of  the Act, 21 U  S C  659ZSC-7(B)(2), unless the authorization is terminated  or revoked sooner  The test has been validated but independent review by FDA  and CLIA is pending  Test performed using [employer]: This RT-PCR assay targets N2,  a region unique to SARS-CoV-2   A conserved region in the E-gene was chosen  for pan-Sarbecovirus detection which includes SARS-CoV-2  According to CMS-2020-01-R, this platform meets the definition of high-throughput technology      Comprehensive metabolic panel [[id number]]  (Abnormal) Collected: 10/05/22 2047    Lab Status: Final result Specimen: Blood from Arm, Left Updated: 10/05/22 2128     Sodium 136 mmol/L      Potassium 3 4 mmol/L      Chloride 102 mmol/L      CO2 22 mmol/L      ANION GAP 12 mmol/L      BUN 15 mg/dL      Creatinine 0 81 mg/dL      Glucose 129 mg/dL      Calcium 9 0 mg/dL      Corrected Calcium 9 6 mg/dL      AST 42 U/L      ALT 79 U/L      Alkaline Phosphatase 147 U/L      Total Protein 7 4 g/dL      Albumin 3 3 g/dL      Total Bilirubin 1 21 mg/dL      eGFR 73 ml/min/1 73sq m     Narrative:      [place] guidelines for Chronic Kidney Disease (CKD):     Stage 1 with normal or high GFR (GFR > 90 mL/min/1 73 square meters)    Stage 2 Mild CKD (GFR = 60-89 mL/min/1 73 square meters)    Stage 3A Moderate CKD (GFR = 45-59 mL/min/1 73 square meters)    Stage 3B Moderate CKD (GFR = 30-44 mL/min/1 73 square meters)    Stage 4 Severe CKD (GFR = 15-29 mL/min/1 73 square meters)    Stage 5 End Stage CKD (GFR <15 mL/min/1 73 square meters)  Note: GFR calculation is accurate only with a steady state creatinine    Magnesium [[id number]]  (Normal) Collected: 10/05/22 2047    Lab Status: Final result Specimen: Blood from Arm, Left Updated: 10/05/22 2128     Magnesium 2 0 mg/dL     C-reactive protein [[id number]]  (Abnormal) Collected: 10/05/22 2047    Lab Status: Final result Specimen: Blood from Arm, Left Updated: 10/05/22 2128      7 mg/L     NT-BNP PRO [[id number]]  (Abnormal) Collected: 10/05/22 2047    Lab Status: Final result Specimen: Blood from Arm, Left Updated: 10/05/22 2128     NT-proBNP 347 pg/mL     CK (with reflex to MB) [[id number]]  (Normal) Collected: 10/05/22 2047    Lab Status: Final result Specimen: Blood from Arm, Left Updated: 10/05/22 2128     Total CK 26 U/L     Procalcitonin [[id number]]  (Abnormal) Collected: 10/05/22 2047    Lab Status: Final result Specimen: Blood from Arm, Left Updated: 10/05/22 2126     Procalcitonin 6 63 ng/ml     HS Troponin 0hr (reflex protocol) [[id number]]  (Normal) Collected: 10/05/22 2047    Lab Status: Final result Specimen: Blood from Arm, Left Updated: 10/05/22 2123     hs TnI 0hr 9 ng/L     Lactic acid, plasma [[id number]]  (Normal) Collected: 10/05/22 2047    Lab Status: Final result Specimen: Blood from Arm, Left Updated: 10/05/22 2121     LACTIC ACID 0 8 mmol/L     Narrative:      Result may be elevated if tourniquet was used during collection  D-dimer, quantitative [[id number]]  (Abnormal) Collected: 10/05/22 2047    Lab Status: Final result Specimen: Blood from Arm, Left Updated: 10/05/22 2115     D-Dimer, Quant 1 27 ug/ml FEU     Narrative: In the evaluation for possible pulmonary embolism, in the appropriate (Well's Score of 4 or less) patient, the age adjusted d-dimer cutoff for this patient can be calculated as:    Age x 0 01 (in ug/mL) for Age-adjusted D-dimer exclusion threshold for a patient over 50 years      Protime-INR [[id number]]  (Normal) Collected: 10/05/22 2047    Lab Status: Final result Specimen: Blood from Arm, Left Updated: 10/05/22 2111     Protime 13 7 seconds      INR 1 03    CBC and differential [[id number]]  (Abnormal) Collected: 10/05/22 2047    Lab Status: Final result Specimen: Blood from Arm, Left Updated: 10/05/22 2059     WBC 21 44 Thousand/uL      RBC 4 38 Million/uL      Hemoglobin 13 6 g/dL      Hematocrit 40 3 %      MCV 92 fL      MCH 31 1 pg      MCHC 33 7 g/dL      RDW 12 9 %      MPV 9 1 fL      Platelets 116 Thousands/uL      nRBC 0 /100 WBCs      Neutrophils Relative 83 %      Immat GRANS % 1 %      Lymphocytes Relative 5 %      Monocytes Relative 10 %      Eosinophils Relative 1 %      Basophils Relative 0 % Neutrophils Absolute 17 77 Thousands/µL      Immature Grans Absolute 0 26 Thousand/uL      Lymphocytes Absolute 1 11 Thousands/µL      Monocytes Absolute 2 10 Thousand/µL      Eosinophils Absolute 0 13 Thousand/µL      Basophils Absolute 0 07 Thousands/µL     Blood culture #2 [[id number]] Collected: 10/05/22 2047    Lab Status: In process Specimen: Blood from Arm, Left Updated: 10/05/22 2056    Blood culture #1 [[id number]] Collected: 10/05/22 2047    Lab Status: In process Specimen: Blood from Hand, Left Updated: 10/05/22 2056                 XR chest 1 view portable    (Results Pending)              Procedures  ECG 12 Lead Documentation Only    Date/Time: 10/5/2022 8:44 PM  Performed by: Lexii Devries DO  Authorized by: Lexii Devries DO     Indications / Diagnosis:  Tachycardia  ECG reviewed by me, the ED Provider: yes    Patient location:  ED  Previous ECG:     Previous ECG:  Unavailable    Comparison to cardiac monitor: Yes    Interpretation:     Interpretation: abnormal    Rate:     ECG rate:  106    ECG rate assessment: tachycardic    Rhythm:     Rhythm: sinus rhythm    Ectopy:     Ectopy: none    QRS:     QRS intervals:  Normal  Conduction:     Conduction: normal    ST segments:     ST segments:  Normal  T waves:     T waves: inverted      Inverted:  III             ED Course  ED Course as of 10/05/22 2229   Wed Oct 05, 2022   2228 Patient COVID positive, however has significant leukocytosis with elevated CRP and procalcitonin, chest x-ray has possible right lower lobe pneumonia, therefore started on antibiotics, discussed with hospitalist service who agrees to admit for further evaluation and treatment, patient in agreement with this plan as well                               SBIRT 20yo+    Flowsheet Row Most Recent Value   SBIRT (23 yo +)    In order to provide better care to our patients, we are screening all of our patients for alcohol and drug use   Would it be okay to ask you these screening questions? Yes Filed at: 10/05/2022 2212   Initial Alcohol Screen: US AUDIT-C     1  How often do you have a drink containing alcohol? 0 Filed at: 10/05/2022 2212   2  How many drinks containing alcohol do you have on a typical day you are drinking? 0 Filed at: 10/05/2022 2212   3b  FEMALE Any Age, or MALE 65+: How often do you have 4 or more drinks on one occassion? 0 Filed at: 10/05/2022 2212   Audit-C Score 0 Filed at: 10/05/2022 2212   ALEXSANDRA: How many times in the past year have you    Used an illegal drug or used a prescription medication for non-medical reasons? Never Filed at: 10/05/2022 2212                      Disposition  Final diagnoses:   COVID-19   Pneumonia   Nausea and vomiting     Time reflects when diagnosis was documented in both MDM as applicable and the Disposition within this note     Time User Action Codes Description Comment    10/5/2022 10:19 PM Marci Ramires Add [U07 1] COVID-19     10/5/2022 10:19 PM Marci Ramires Add [J18 9] Pneumonia     10/5/2022 10:19 PM Kailey Fields Add [R11 2] Nausea and vomiting       ED Disposition     ED Disposition   Admit    Condition   Stable    Date/Time   Wed Oct 5, 2022 10:18 PM    Comment   Case was discussed with NP Kathlynn Merlin and the patient's admission status was agreed to be Admission Status: inpatient status to the service of Dr Edil Rich   Follow-up Information    None         Patient's Medications   Discharge Prescriptions    No medications on file       No discharge procedures on file      PDMP Review     None          ED Provider  Electronically Signed by           Neida Deal DO  10/05/22 2237

## 2022-10-06 NOTE — ASSESSMENT & PLAN NOTE
· UA abnormal showing nitrates, pyuria   · Follow up urine culture, continue Rocephin   · Kidney ultrasound normal

## 2022-10-06 NOTE — ASSESSMENT & PLAN NOTE
· Presents from home with sore throat , chills, malaise for over 1 week  In ED tested COVID + 10/5/22  · Reports COVID vaccinated x 3    · + sepsis criteria   · XR chest negative for pneumonia  · Admit to mild COVID pathway, currently on room air     · D-dimer 1 27, age appropriate   · DVT ppx Heparin SQ   · Continue Rocephin , remdesivir  · Continue supportive care, encourage prone , incentive spirometry

## 2022-10-06 NOTE — ASSESSMENT & PLAN NOTE
· Sepsis criteria present on admission as evidenced by tachycardia, leukocytosis WBC 21 4, COVID+, UA abnormal   · Multifactorial in the setting of COVID 19 and acute cystitis  · Lactate normal  Procalcitonin 6 63  · F/U blood and urine cultures- Continue Rocephin   · Trend CBC, procalcitonin

## 2022-10-06 NOTE — ASSESSMENT & PLAN NOTE
· Presents from home with sore throat , chills, malaise for over 1 week  In ED tested COVID + 10/5/22  · Reports COVID vaccinated x 3    · + sepsis criteria   · XR chest pending final read  · Admit to mild COVID pathway, currently on room air     · D-dimer 1 27, age appropriate   · No respiratory complaints, will hold off on further imaging   · DVT ppx Heparin SQ   · Continue Rocephin   · Continue supportive care, encourage prone , incentive spirometry

## 2022-10-06 NOTE — PLAN OF CARE
Problem: INFECTION - ADULT  Goal: Absence or prevention of progression during hospitalization  Description: INTERVENTIONS:  - Assess and monitor for signs and symptoms of infection  - Monitor lab/diagnostic results  - Monitor all insertion sites, i e  indwelling lines, tubes, and drains  - Monitor endotracheal if appropriate and nasal secretions for changes in amount and color  - [place] appropriate cooling/warming therapies per order  - Administer medications as ordered  - Instruct and encourage patient and family to use good hand hygiene technique  - Identify and instruct in appropriate isolation precautions for identified infection/condition  Outcome: Progressing  Goal: Absence of fever/infection during neutropenic period  Description: INTERVENTIONS:  - Monitor WBC    Outcome: Progressing     Problem: SAFETY ADULT  Goal: Patient will remain free of falls  Description: INTERVENTIONS:  - Educate patient/family on patient safety including physical limitations  - Instruct patient to call for assistance with activity   - Consult OT/PT to assist with strengthening/mobility   - Keep Call bell within reach  - Keep bed low and locked with side rails adjusted as appropriate  - Keep care items and personal belongings within reach  - Initiate and maintain comfort rounds  - Make Fall Risk Sign visible to staff  - Offer Toileting every 2 Hours, in advance of need  - Initiate/Maintain alarm  - Obtain necessary fall risk management equipment  - Apply yellow socks and bracelet for high fall risk patients  - Consider moving patient to room near nurses station  Outcome: Progressing  Goal: Maintain or return to baseline ADL function  Description: INTERVENTIONS:  -  Assess patient's ability to carry out ADLs; assess patient's baseline for ADL function and identify physical deficits which impact ability to perform ADLs (bathing, care of mouth/teeth, toileting, grooming, dressing, etc )  - Assess/evaluate cause of self-care deficits   - Assess range of motion  - Assess patient's mobility; develop plan if impaired  - Assess patient's need for assistive devices and provide as appropriate  - Encourage maximum independence but intervene and supervise when necessary  - Involve family in performance of ADLs  - Assess for home care needs following discharge   - Consider OT consult to assist with ADL evaluation and planning for discharge  - Provide patient education as appropriate  Outcome: Progressing  Goal: Maintains/Returns to pre admission functional level  Description: INTERVENTIONS:  - Perform BMAT or MOVE assessment daily    - Set and communicate daily mobility goal to care team and patient/family/caregiver  - Collaborate with rehabilitation services on mobility goals if consulted  - Perform Range of Motion - times a day  - Reposition patient every - hours    - Dangle patient - times a day  - Stand patient - times a day  - Ambulate patient - times a day  - Out of bed to chair - times a day   - Out of bed for meals - times a day  - Out of bed for toileting  - Record patient progress and toleration of activity level   Outcome: Progressing     Problem: DISCHARGE PLANNING  Goal: Discharge to home or other facility with appropriate resources  Description: INTERVENTIONS:  - Identify barriers to discharge w/patient and caregiver  - Arrange for needed discharge resources and transportation as appropriate  - Identify discharge learning needs (meds, wound care, etc )  - Arrange for interpretive services to assist at discharge as needed  - Refer to Case Management Department for coordinating discharge planning if the patient needs post-hospital services based on physician/advanced practitioner order or complex needs related to functional status, cognitive ability, or social support system  Outcome: Progressing     Problem: Knowledge Deficit  Goal: Patient/family/caregiver demonstrates understanding of disease process, treatment plan, medications, and discharge instructions  Description: Complete learning assessment and assess knowledge base    Interventions:  - Provide teaching at level of understanding  - Provide teaching via preferred learning methods  Outcome: Progressing

## 2022-10-06 NOTE — UTILIZATION REVIEW
Inpatient Admission Authorization Request   NOTIFICATION OF INPATIENT ADMISSION/INPATIENT AUTHORIZATION REQUEST   SERVICING FACILITY:   [address] [place] [address]  Jocelyn Walden  [place] [address] [address]  Tax ID: [id number]  NPI: [id number]  Place of Service: Inpatient [address] [facility]  [address]  Place of Service Code: 24     ATTENDING PROVIDER:  Attending Name and NPI#: Falguni Orellana Md [[id number]]  Address: Jocelyn Walden  [place] [address] [address]  Phone: [phone]     UTILIZATION REVIEW CONTACT:  Jovi Calderon, Utilization   Network Utilization Review Department  Phone: [phone]  Fax [phone]  Email: [email or web address]     PHYSICIAN ADVISORY SERVICES:  FOR JELP-[id number] REVIEW - MEDICAL NECESSITY DENIAL  Phone: [phone]  Fax: [phone]  Email: [email or web address]     TYPE OF REQUEST:  Inpatient Status     ADMISSION INFORMATION:  ADMISSION DATE/TIME: 10/5/22 10:20 PM  PATIENT DIAGNOSIS CODE/DESCRIPTION:  Pneumonia [J18 9]  Nausea and vomiting [R11 2]  Flu-like symptoms [R68 89]  [id number] [U07 1]  DISCHARGE DATE/TIME: No discharge date for patient encounter  IMPORTANT INFORMATION:  Please contact Catrachita Sims directly with any questions or concerns regarding this request  Department voicemails are confidential     Send requests for admission clinical reviews, concurrent reviews, approvals, and administrative denials due to lack of clinical to fax [phone]

## 2022-10-06 NOTE — PLAN OF CARE
Problem: INFECTION - ADULT  Goal: Absence or prevention of progression during hospitalization  Description: INTERVENTIONS:  - Assess and monitor for signs and symptoms of infection  - Monitor lab/diagnostic results  - Monitor all insertion sites, i e  indwelling lines, tubes, and drains  - Monitor endotracheal if appropriate and nasal secretions for changes in amount and color  - [place] appropriate cooling/warming therapies per order  - Administer medications as ordered  - Instruct and encourage patient and family to use good hand hygiene technique  - Identify and instruct in appropriate isolation precautions for identified infection/condition  Outcome: Progressing  Goal: Absence of fever/infection during neutropenic period  Description: INTERVENTIONS:  - Monitor WBC    Outcome: Progressing     Problem: SAFETY ADULT  Goal: Patient will remain free of falls  Description: INTERVENTIONS:  - Educate patient/family on patient safety including physical limitations  - Instruct patient to call for assistance with activity   - Consult OT/PT to assist with strengthening/mobility   - Keep Call bell within reach  - Keep bed low and locked with side rails adjusted as appropriate  - Keep care items and personal belongings within reach  - Initiate and maintain comfort rounds  - Make Fall Risk Sign visible to staff  - Offer Toileting every   Hours, in advance of need  - Initiate/Maintain   alarm  - Obtain necessary fall risk management equipment:     - Apply yellow socks and bracelet for high fall risk patients  - Consider moving patient to room near nurses station  Outcome: Progressing  Goal: Maintain or return to baseline ADL function  Description: INTERVENTIONS:  -  Assess patient's ability to carry out ADLs; assess patient's baseline for ADL function and identify physical deficits which impact ability to perform ADLs (bathing, care of mouth/teeth, toileting, grooming, dressing, etc )  - Assess/evaluate cause of self-care deficits   - Assess range of motion  - Assess patient's mobility; develop plan if impaired  - Assess patient's need for assistive devices and provide as appropriate  - Encourage maximum independence but intervene and supervise when necessary  - Involve family in performance of ADLs  - Assess for home care needs following discharge   - Consider OT consult to assist with ADL evaluation and planning for discharge  - Provide patient education as appropriate  Outcome: Progressing  Goal: Maintains/Returns to pre admission functional level  Description: INTERVENTIONS:  - Perform BMAT or MOVE assessment daily    - Set and communicate daily mobility goal to care team and patient/family/caregiver  - Collaborate with rehabilitation services on mobility goals if consulted  - Perform Range of Motion   times a day  - Reposition patient every   hours  - Dangle patient   times a day  - Stand patient   times a day  - Ambulate patient   times a day  - Out of bed to chair   times a day   - Out of bed for meals     times a day  - Out of bed for toileting  - Record patient progress and toleration of activity level   Outcome: Progressing     Problem: DISCHARGE PLANNING  Goal: Discharge to home or other facility with appropriate resources  Description: INTERVENTIONS:  - Identify barriers to discharge w/patient and caregiver  - Arrange for needed discharge resources and transportation as appropriate  - Identify discharge learning needs (meds, wound care, etc )  - Arrange for interpretive services to assist at discharge as needed  - Refer to Case Management Department for coordinating discharge planning if the patient needs post-hospital services based on physician/advanced practitioner order or complex needs related to functional status, cognitive ability, or social support system  Outcome: Progressing     Problem: Knowledge Deficit  Goal: Patient/family/caregiver demonstrates understanding of disease process, treatment plan, medications, and discharge instructions  Description: Complete learning assessment and assess knowledge base    Interventions:  - Provide teaching at level of understanding  - Provide teaching via preferred learning methods  Outcome: Progressing

## 2022-10-06 NOTE — PROGRESS NOTES
[address]  Progress Note - [facility] 1950, 81 y o  female MRN: [id number]  Unit/Bed#: MS Joey-Sky Encounter: [id number]  Primary Care Provider: Sabrina Chester MD   Date and time admitted to hospital: 10/5/2022  8:19 PM    * COVID-19  Assessment & Plan  · Presents from home with sore throat , chills, malaise for over 1 week  In ED tested COVID + 10/5/22  · Reports COVID vaccinated x 3    · + sepsis criteria   · XR chest negative for pneumonia  · Admit to mild COVID pathway, currently on room air  · D-dimer 1 27, age appropriate   · DVT ppx Heparin SQ   · Continue Rocephin , remdesivir  · Continue supportive care, encourage prone , incentive spirometry     Sepsis ([facility] [address] )  Assessment & Plan  · Sepsis criteria present on admission as evidenced by tachycardia, leukocytosis WBC 21 4, COVID+, UA abnormal   · Multifactorial in the setting of COVID 19 and acute cystitis  · Lactate normal  Procalcitonin 6 63  · F/U blood and urine cultures- Continue Rocephin   · Trend CBC, procalcitonin     Acute cystitis with hematuria  Assessment & Plan  · UA abnormal showing nitrates, pyuria   · Follow up urine culture, continue Rocephin   · Kidney ultrasound normal    Hypokalemia  Assessment & Plan  · K 3 4 , repleted  · Repeat labs show normal potassium    Essential hypertension  Assessment & Plan  · BP's stable  · Continue Norvasc      VTE Pharmacologic Prophylaxis:   Pharmacologic: Heparin  Mechanical VTE Prophylaxis in Place: Yes    Patient Centered Rounds: I have performed bedside rounds with nursing staff today  Discussions with Specialists or Other Care Team Provider:  None    Education and Discussions with Family / Patient:  Discussed with patient bedside and updated daughter    Time Spent for Care: 30 minutes  More than 50% of total time spent on counseling and coordination of care as described above      Current Length of Stay: 1 day(s)    Current Patient Status: Inpatient Certification Statement: The patient will continue to require additional inpatient hospital stay due to COVID-19 and acute cystitis without hematuria    Discharge Plan:  Anticipate discharge home tomorrow    Code Status: Level 1 - Full Code      Subjective:   Patient states that she has a bit of a headache otherwise denies any chest pain or shortness of breath still having some low-grade fevers  Denies any dysuria    Objective:     Vitals:   Temp (24hrs), Av 3 °F (37 4 °C), Min:98 8 °F (37 1 °C), Max:100 °F (37 8 °C)    Temp:  [98 8 °F (37 1 °C)-100 °F (37 8 °C)] 99 °F (37 2 °C)  HR:  [] 106  Resp:  [18-20] 18  BP: (107-158)/(56-65) 121/65  SpO2:  [93 %-96 %] 96 %  Body mass index is 30 26 kg/m²  Input and Output Summary (last 24 hours): Intake/Output Summary (Last 24 hours) at 10/6/2022 1359  Last data filed at 10/6/2022 1032  Gross per 24 hour   Intake 1230 ml   Output 400 ml   Net 830 ml       Physical Exam:     Physical Exam  Vitals and nursing note reviewed  Constitutional:       Appearance: She is ill-appearing  HENT:      Head: Normocephalic and atraumatic  Right Ear: External ear normal       Left Ear: External ear normal       Nose: Nose normal       Mouth/Throat:      Pharynx: Oropharynx is clear  Eyes:      Pupils: Pupils are equal, round, and reactive to light  Cardiovascular:      Rate and Rhythm: Normal rate and regular rhythm  Heart sounds: Normal heart sounds  Pulmonary:      Effort: Pulmonary effort is normal       Breath sounds: Normal breath sounds  Abdominal:      General: Bowel sounds are normal       Palpations: Abdomen is soft  Tenderness: There is no abdominal tenderness  Musculoskeletal:         General: Normal range of motion  Cervical back: Normal range of motion and neck supple  Skin:     General: Skin is warm and dry  Capillary Refill: Capillary refill takes less than 2 seconds     Neurological:      General: No focal deficit present  Mental Status: She is alert and oriented to person, place, and time  Psychiatric:         Mood and Affect: Mood normal            Additional Data:     Labs:    Results from last 7 days   Lab Units 10/06/22  0541   WBC Thousand/uL 20 89*   HEMOGLOBIN g/dL 12 8   HEMATOCRIT % 38 5   PLATELETS Thousands/uL 302   NEUTROS PCT % 84*   LYMPHS PCT % 9*   MONOS PCT % 6   EOS PCT % 0     Results from last 7 days   Lab Units 10/06/22  0541   SODIUM mmol/L 138   POTASSIUM mmol/L 3 7   CHLORIDE mmol/L 104   CO2 mmol/L 24   BUN mg/dL 17   CREATININE mg/dL 0 89   ANION GAP mmol/L 10   CALCIUM mg/dL 8 4   ALBUMIN g/dL 3 0*   TOTAL BILIRUBIN mg/dL 0 58   ALK PHOS U/L 136*   ALT U/L 68   AST U/L 28   GLUCOSE RANDOM mg/dL 122     Results from last 7 days   Lab Units 10/05/22  2047   INR  1 03             Results from last 7 days   Lab Units 10/06/22  0541 10/05/22  2047   LACTIC ACID mmol/L  --  0 8   PROCALCITONIN ng/ml 9 96* 7 07*  6 63*           * I Have Reviewed All Lab Data Listed Above  * Additional Pertinent Lab Tests Reviewed: [address] Admission Reviewed    Imaging:    Imaging Reports Reviewed Today Include:  Chest x-ray  Imaging Personally Reviewed by Myself Includes:  Chest x-ray    Recent Cultures (last 7 days):     Results from last 7 days   Lab Units 10/05/22  2047   BLOOD CULTURE  Received in Microbiology Lab  Culture in Progress  Received in Microbiology Lab  Culture in Progress         Last 24 Hours Medication List:   Current Facility-Administered Medications   Medication Dose Route Frequency Provider Last Rate    acetaminophen  650 mg Oral Q6H PRN Nessa Mini, CRNP      amLODIPine  5 mg Oral Daily Nessa Mini, CRNP      aspirin  81 mg Oral Daily Nessa Mini, [place]      atorvastatin  40 mg Oral Daily With [employer], CRNP      benzonatate  100 mg Oral TID PRN Nessa Mini, CRNP      calcium carbonate  1,000 mg Oral Daily PRN Nessa Mini, CRNP      cefTRIAXone  1,000 mg Intravenous Q24H Blanca Cowden, CRNP      heparin (porcine)  5,000 Units Subcutaneous [facility] Blanca Cowden, CRNP      [START ON 10/7/2022] influenza vaccine  0 7 mL Intramuscular Prior to discharge Berhane Parks MD      levothyroxine  112 mcg Oral Early Morning Blanca Cowden, CRNP      multivitamin-minerals  1 tablet Oral Daily [place] Cowden, [place]      ondansetron  4 mg Intravenous Q6H PRN Blanca Cowden, CRNP      remdesivir  200 mg Intravenous Q24H Aimee Chakraborty MD      Followed by   Walter Cesar ON 10/7/2022] remdesivir  100 mg Intravenous Q24H Aimee Chakraborty MD          Today, Patient Was Seen By: Aimee Chakraborty    ** Please Note: Dictation voice to text software may have been used in the creation of this document   **

## 2022-10-07 VITALS
WEIGHT: 170.8 LBS | RESPIRATION RATE: 15 BRPM | HEIGHT: 63 IN | TEMPERATURE: 98.4 F | OXYGEN SATURATION: 95 % | BODY MASS INDEX: 30.26 KG/M2 | SYSTOLIC BLOOD PRESSURE: 115 MMHG | DIASTOLIC BLOOD PRESSURE: 66 MMHG | HEART RATE: 88 BPM

## 2022-10-07 PROBLEM — R78.81 E COLI BACTEREMIA: Status: ACTIVE | Noted: 2022-10-06

## 2022-10-07 PROBLEM — B96.20 E COLI BACTEREMIA: Status: ACTIVE | Noted: 2022-10-06

## 2022-10-07 LAB
ANION GAP SERPL CALCULATED.3IONS-SCNC: 8 MMOL/L (ref 4–13)
BUN SERPL-MCNC: 15 MG/DL (ref 5–25)
CALCIUM SERPL-MCNC: 8.1 MG/DL (ref 8.3–10.1)
CHLORIDE SERPL-SCNC: 103 MMOL/L (ref 96–108)
CO2 SERPL-SCNC: 24 MMOL/L (ref 21–32)
CREAT SERPL-MCNC: 0.66 MG/DL (ref 0.6–1.3)
ERYTHROCYTE [DISTWIDTH] IN BLOOD BY AUTOMATED COUNT: 13 % (ref 11.6–15.1)
GFR SERPL CREATININE-BSD FRML MDRD: 89 ML/MIN/1.73SQ M
GLUCOSE SERPL-MCNC: 114 MG/DL (ref 65–140)
HCT VFR BLD AUTO: 34.7 % (ref 34.8–46.1)
HGB BLD-MCNC: 11.6 G/DL (ref 11.5–15.4)
MCH RBC QN AUTO: 30.7 PG (ref 26.8–34.3)
MCHC RBC AUTO-ENTMCNC: 33.4 G/DL (ref 31.4–37.4)
MCV RBC AUTO: 92 FL (ref 82–98)
PLATELET # BLD AUTO: 268 THOUSANDS/UL (ref 149–390)
PMV BLD AUTO: 9.1 FL (ref 8.9–12.7)
POTASSIUM SERPL-SCNC: 3.9 MMOL/L (ref 3.5–5.3)
RBC # BLD AUTO: 3.78 MILLION/UL (ref 3.81–5.12)
SODIUM SERPL-SCNC: 135 MMOL/L (ref 135–147)
WBC # BLD AUTO: 13.83 THOUSAND/UL (ref 4.31–10.16)

## 2022-10-07 PROCEDURE — 99239 HOSP IP/OBS DSCHRG MGMT >30: CPT | Performed by: FAMILY MEDICINE

## 2022-10-07 PROCEDURE — 80048 BASIC METABOLIC PNL TOTAL CA: CPT | Performed by: FAMILY MEDICINE

## 2022-10-07 PROCEDURE — 85027 COMPLETE CBC AUTOMATED: CPT | Performed by: FAMILY MEDICINE

## 2022-10-07 RX ORDER — ACETAMINOPHEN 325 MG/1
650 TABLET ORAL EVERY 6 HOURS PRN
Refills: 0
Start: 2022-10-07

## 2022-10-07 RX ORDER — CEFUROXIME AXETIL 500 MG/1
500 TABLET ORAL EVERY 12 HOURS SCHEDULED
Qty: 14 TABLET | Refills: 0 | Status: SHIPPED | OUTPATIENT
Start: 2022-10-07 | End: 2022-10-14

## 2022-10-07 RX ADMIN — AMLODIPINE BESYLATE 5 MG: 5 TABLET ORAL at 08:16

## 2022-10-07 RX ADMIN — ACETAMINOPHEN 650 MG: 325 TABLET ORAL at 05:15

## 2022-10-07 RX ADMIN — LEVOTHYROXINE SODIUM 112 MCG: 112 TABLET ORAL at 05:08

## 2022-10-07 RX ADMIN — Medication 1 TABLET: at 08:16

## 2022-10-07 RX ADMIN — HEPARIN SODIUM 5000 UNITS: 5000 INJECTION INTRAVENOUS; SUBCUTANEOUS at 05:08

## 2022-10-07 RX ADMIN — ASPIRIN 81 MG: 81 TABLET, COATED ORAL at 08:16

## 2022-10-07 NOTE — PLAN OF CARE
Problem: INFECTION - ADULT  Goal: Absence or prevention of progression during hospitalization  Description: INTERVENTIONS:  - Assess and monitor for signs and symptoms of infection  - Monitor lab/diagnostic results  - Monitor all insertion sites, i e  indwelling lines, tubes, and drains  - Monitor endotracheal if appropriate and nasal secretions for changes in amount and color  - [place] appropriate cooling/warming therapies per order  - Administer medications as ordered  - Instruct and encourage patient and family to use good hand hygiene technique  - Identify and instruct in appropriate isolation precautions for identified infection/condition  Outcome: Progressing  Goal: Absence of fever/infection during neutropenic period  Description: INTERVENTIONS:  - Monitor WBC    Outcome: Progressing     Problem: SAFETY ADULT  Goal: Patient will remain free of falls  Description: INTERVENTIONS:  - Educate patient/family on patient safety including physical limitations  - Instruct patient to call for assistance with activity   - Consult OT/PT to assist with strengthening/mobility   - Keep Call bell within reach  - Keep bed low and locked with side rails adjusted as appropriate  - Keep care items and personal belongings within reach  - Initiate and maintain comfort rounds  - Make Fall Risk Sign visible to staff  - Apply yellow socks and bracelet for high fall risk patients  Outcome: Progressing  Goal: Maintain or return to baseline ADL function  Description: INTERVENTIONS:  -  Assess patient's ability to carry out ADLs; assess patient's baseline for ADL function and identify physical deficits which impact ability to perform ADLs (bathing, care of mouth/teeth, toileting, grooming, dressing, etc )  - Assess/evaluate cause of self-care deficits   - Assess range of motion  - Assess patient's mobility; develop plan if impaired  - Assess patient's need for assistive devices and provide as appropriate  - Encourage maximum independence but intervene and supervise when necessary  - Involve family in performance of ADLs  - Assess for home care needs following discharge   - Consider OT consult to assist with ADL evaluation and planning for discharge  - Provide patient education as appropriate  Outcome: Progressing  Goal: Maintains/Returns to pre admission functional level  Description: INTERVENTIONS:  - Perform BMAT or MOVE assessment daily    - Set and communicate daily mobility goal to care team and patient/family/caregiver  - Collaborate with rehabilitation services on mobility goals if consulted  - Out of bed for toileting  - Record patient progress and toleration of activity level   Outcome: Progressing     Problem: DISCHARGE PLANNING  Goal: Discharge to home or other facility with appropriate resources  Description: INTERVENTIONS:  - Identify barriers to discharge w/patient and caregiver  - Arrange for needed discharge resources and transportation as appropriate  - Identify discharge learning needs (meds, wound care, etc )  - Arrange for interpretive services to assist at discharge as needed  - Refer to Case Management Department for coordinating discharge planning if the patient needs post-hospital services based on physician/advanced practitioner order or complex needs related to functional status, cognitive ability, or social support system  Outcome: Progressing     Problem: Knowledge Deficit  Goal: Patient/family/caregiver demonstrates understanding of disease process, treatment plan, medications, and discharge instructions  Description: Complete learning assessment and assess knowledge base    Interventions:  - Provide teaching at level of understanding  - Provide teaching via preferred learning methods  Outcome: Progressing

## 2022-10-07 NOTE — ASSESSMENT & PLAN NOTE
· UA abnormal showing nitrates, pyuria   · urine culture showing E coli, received 2 doses of Rocephin will discharge on oral Ceftin  · Kidney ultrasound normal

## 2022-10-07 NOTE — DISCHARGE SUMMARY
[address] [facility]  Discharge- [facility] [place] 1950, 81 y o  female MRN: [id number]  Unit/Bed#: MS Joey-Sky Encounter: [id number]  Primary Care Provider: Katia Mckeon MD   Date and time admitted to hospital: 10/5/2022  8:19 PM    * COVID-19  Assessment & Plan  · Presents from home with sore throat , chills, malaise for over 1 week  In ED tested COVID + 10/5/22  · Reports COVID vaccinated x 3    · + sepsis criteria   · XR chest negative for pneumonia  · Admit to mild COVID pathway, currently on room air  · D-dimer 1 27, age appropriate   · DVT ppx Heparin SQ   · Received Rocephin , remdesivir  · Continue supportive care, encourage prone , incentive spirometry   · Will discharge home with supportive care today  E coli bacteremia  Assessment & Plan  · Sepsis criteria present on admission as evidenced by tachycardia, leukocytosis WBC 21 4, COVID+, UA abnormal   · Blood cultures showing Gram-negative rods resembling E coli  Urine culture showing E coli    · Afebrile last 24 hours  Leukocytosis is improving  Received 2 doses of IV Rocephin will discharge on oral Ceftin to complete a total 7 day course    Acute cystitis with hematuria  Assessment & Plan  · UA abnormal showing nitrates, pyuria   · urine culture showing E coli, received 2 doses of Rocephin will discharge on oral Ceftin  · Kidney ultrasound normal    Hypokalemia  Assessment & Plan  · K 3 4 , repleted  · Repeat labs show normal potassium    Nausea  Assessment & Plan  · Reported episode of nausea and vomiting day of admission   No abdominal pain, tolerating PO intake now    · Likely secondary to acute infection (COVID 19, acute cystitis )    Essential hypertension  Assessment & Plan  · BP's stable  · Continue Norvasc      Discharging Physician / Practitioner: Kat Weems  PCP: Katia Mckeon MD  Admission Date:   Admission Orders (From admission, onward)     Ordered        10/05/22 1878  INPATIENT ADMISSION  Once Discharge Date: 10/07/22    Medical Problems             Resolved Problems  Date Reviewed: 10/7/2022   None                 Consultations During Hospital Stay:  · none    Procedures Performed:   · none    Significant Findings / Test Results:   XR chest 1 view portable    Result Date: 10/6/2022  Impression: No acute cardiopulmonary disease  Workstation performed: [id number]      kidney and bladder    Result Date: 10/6/2022  Impression: Normal  Workstation performed: [id number]     Incidental Findings:   · E coli bacteremia and urine culture    Test Results Pending at Discharge (will require follow up): · None     Outpatient Tests Requested:  · None    Complications:  None    Reason for Admission:  Sepsis    Hospital Course:     Nataly Mejia is a 81 y o  female patient who originally presented to the hospital on 10/5/2022 due to sepsis present on admission secondary to E coli bacteremia and acute cystitis without hematuria due to E coli  Also tested positive for COVID-19 but no evidence of pneumonia noted  Patient did receive 1 dose of remdesivir and also 2 doses of IV Rocephin clinically improving with no fever last 24 hours leukocytosis improving will complete total 7 day course of Ceftin and discharge home today and continue supportive care  Fortunately patient has no respiratory complaints today and chest x-ray did not show any evidence of pneumonia  Please see above list of diagnoses and related plan for additional information  Condition at Discharge: good     Discharge Day Visit / Exam:     Subjective:  Patient denies any chest pain or shortness of breath or abdominal pain    Feels well today denies any body aches or chills  Vitals: Blood Pressure: 115/66 (10/07/22 0749)  Pulse: 88 (10/07/22 0749)  Temperature: 98 4 °F (36 9 °C) (10/07/22 0749)  Temp Source: Temporal (10/06/22 0934)  Respirations: 15 (10/07/22 0749)  Height: 5' 3" (160 cm) (10/05/22 2302)  Weight - Scale: 77 5 kg (170 lb 12 8 oz) (10/05/22 2302)  SpO2: 95 % (10/07/22 5158)  Exam:   Physical Exam  Vitals and nursing note reviewed  Constitutional:       Appearance: Normal appearance  HENT:      Head: Normocephalic and atraumatic  Right Ear: External ear normal       Left Ear: External ear normal       Nose: Nose normal       Mouth/Throat:      Pharynx: Oropharynx is clear  Cardiovascular:      Rate and Rhythm: Normal rate and regular rhythm  Heart sounds: Normal heart sounds  Pulmonary:      Effort: Pulmonary effort is normal       Breath sounds: Normal breath sounds  Abdominal:      General: Bowel sounds are normal       Palpations: Abdomen is soft  Tenderness: There is no abdominal tenderness  Musculoskeletal:         General: Normal range of motion  Cervical back: Normal range of motion and neck supple  Skin:     General: Skin is warm and dry  Capillary Refill: Capillary refill takes less than 2 seconds  Neurological:      General: No focal deficit present  Mental Status: She is alert and oriented to person, place, and time  Psychiatric:         Mood and Affect: Mood normal          Discussion with Family:  Updated daughter    Discharge instructions/Information to patient and family:   See after visit summary for information provided to patient and family  Provisions for Follow-Up Care:  See after visit summary for information related to follow-up care and any pertinent home health orders  Disposition:     Home    For Discharges to [facility] SNF:   · Not Applicable to this Patient - Not Applicable to this Patient    Planned Readmission:  None     Discharge Statement:  I spent 35 minutes discharging the patient  This time was spent on the day of discharge  I had direct contact with the patient on the day of discharge   Greater than 50% of the total time was spent examining patient, answering all patient questions, arranging and discussing plan of care with patient as well as directly providing post-discharge instructions  Additional time then spent on discharge activities  Discharge Medications:  See after visit summary for reconciled discharge medications provided to patient and family        ** Please Note: This note has been constructed using a voice recognition system **

## 2022-10-07 NOTE — PLAN OF CARE
Problem: INFECTION - ADULT  Goal: Absence or prevention of progression during hospitalization  Description: INTERVENTIONS:  - Assess and monitor for signs and symptoms of infection  - Monitor lab/diagnostic results  - Monitor all insertion sites, i e  indwelling lines, tubes, and drains  - Monitor endotracheal if appropriate and nasal secretions for changes in amount and color  - [place] appropriate cooling/warming therapies per order  - Administer medications as ordered  - Instruct and encourage patient and family to use good hand hygiene technique  - Identify and instruct in appropriate isolation precautions for identified infection/condition  Outcome: Progressing  Goal: Absence of fever/infection during neutropenic period  Description: INTERVENTIONS:  - Monitor WBC    Outcome: Progressing     Problem: SAFETY ADULT  Goal: Patient will remain free of falls  Description: INTERVENTIONS:  - Educate patient/family on patient safety including physical limitations  - Instruct patient to call for assistance with activity   - Consult OT/PT to assist with strengthening/mobility   - Keep Call bell within reach  - Keep bed low and locked with side rails adjusted as appropriate  - Keep care items and personal belongings within reach  - Initiate and maintain comfort rounds  - Make Fall Risk Sign visible to staff  - Apply yellow socks and bracelet for high fall risk patients  - Consider moving patient to room near nurses station  Outcome: Progressing  Goal: Maintain or return to baseline ADL function  Description: INTERVENTIONS:  -  Assess patient's ability to carry out ADLs; assess patient's baseline for ADL function and identify physical deficits which impact ability to perform ADLs (bathing, care of mouth/teeth, toileting, grooming, dressing, etc )  - Assess/evaluate cause of self-care deficits   - Assess range of motion  - Assess patient's mobility; develop plan if impaired  - Assess patient's need for assistive devices and provide as appropriate  - Encourage maximum independence but intervene and supervise when necessary  - Involve family in performance of ADLs  - Assess for home care needs following discharge   - Consider OT consult to assist with ADL evaluation and planning for discharge  - Provide patient education as appropriate  Outcome: Progressing  Goal: Maintains/Returns to pre admission functional level  Description: INTERVENTIONS:  - Perform BMAT or MOVE assessment daily    - Set and communicate daily mobility goal to care team and patient/family/caregiver  - Collaborate with rehabilitation services on mobility goals if consulted  - Out of bed for toileting  - Record patient progress and toleration of activity level   Outcome: Progressing     Problem: DISCHARGE PLANNING  Goal: Discharge to home or other facility with appropriate resources  Description: INTERVENTIONS:  - Identify barriers to discharge w/patient and caregiver  - Arrange for needed discharge resources and transportation as appropriate  - Identify discharge learning needs (meds, wound care, etc )  - Arrange for interpretive services to assist at discharge as needed  - Refer to Case Management Department for coordinating discharge planning if the patient needs post-hospital services based on physician/advanced practitioner order or complex needs related to functional status, cognitive ability, or social support system  Outcome: Progressing     Problem: Knowledge Deficit  Goal: Patient/family/caregiver demonstrates understanding of disease process, treatment plan, medications, and discharge instructions  Description: Complete learning assessment and assess knowledge base    Interventions:  - Provide teaching at level of understanding  - Provide teaching via preferred learning methods  Outcome: Progressing

## 2022-10-07 NOTE — PLAN OF CARE
Problem: INFECTION - ADULT  Goal: Absence or prevention of progression during hospitalization  Description: INTERVENTIONS:  - Assess and monitor for signs and symptoms of infection  - Monitor lab/diagnostic results  - Monitor all insertion sites, i e  indwelling lines, tubes, and drains  - Monitor endotracheal if appropriate and nasal secretions for changes in amount and color  - [place] appropriate cooling/warming therapies per order  - Administer medications as ordered  - Instruct and encourage patient and family to use good hand hygiene technique  - Identify and instruct in appropriate isolation precautions for identified infection/condition  10/7/2022 1121 by Jayjay Gold RN  Outcome: Adequate for Discharge  10/7/2022 2249 by Jayjay Gold RN  Outcome: Progressing  Goal: Absence of fever/infection during neutropenic period  Description: INTERVENTIONS:  - Monitor WBC    10/7/2022 1121 by Jayjay Gold RN  Outcome: Adequate for Discharge  10/7/2022 0728 by Jayjay Gold RN  Outcome: Progressing     Problem: SAFETY ADULT  Goal: Patient will remain free of falls  Description: INTERVENTIONS:  - Educate patient/family on patient safety including physical limitations  - Instruct patient to call for assistance with activity   - Consult OT/PT to assist with strengthening/mobility   - Keep Call bell within reach  - Keep bed low and locked with side rails adjusted as appropriate  - Keep care items and personal belongings within reach  - Initiate and maintain comfort rounds  - Make Fall Risk Sign visible to staff  - Apply yellow socks and bracelet for high fall risk patients  10/7/2022 1121 by Jayjay Gold RN  Outcome: Adequate for Discharge  10/7/2022 1214 by Jayjay Gold RN  Outcome: Progressing  Goal: Maintain or return to baseline ADL function  Description: INTERVENTIONS:  -  Assess patient's ability to carry out ADLs; assess patient's baseline for ADL function and identify physical deficits which impact ability to perform ADLs (bathing, care of mouth/teeth, toileting, grooming, dressing, etc )  - Assess/evaluate cause of self-care deficits   - Assess range of motion  - Assess patient's mobility; develop plan if impaired  - Assess patient's need for assistive devices and provide as appropriate  - Encourage maximum independence but intervene and supervise when necessary  - Involve family in performance of ADLs  - Assess for home care needs following discharge   - Consider OT consult to assist with ADL evaluation and planning for discharge  - Provide patient education as appropriate  10/7/2022 1121 by Jayjay Gold RN  Outcome: Adequate for Discharge  10/7/2022 5934 by Jayjay Gold RN  Outcome: Progressing  Goal: Maintains/Returns to pre admission functional level  Description: INTERVENTIONS:  - Perform BMAT or MOVE assessment daily    - Set and communicate daily mobility goal to care team and patient/family/caregiver     - Collaborate with rehabilitation services on mobility goals if consulted  - Out of bed for toileting  - Record patient progress and toleration of activity level   10/7/2022 1121 by Jayjay Gold RN  Outcome: Adequate for Discharge  10/7/2022 8944 by Jayjay Gold RN  Outcome: Progressing     Problem: DISCHARGE PLANNING  Goal: Discharge to home or other facility with appropriate resources  Description: INTERVENTIONS:  - Identify barriers to discharge w/patient and caregiver  - Arrange for needed discharge resources and transportation as appropriate  - Identify discharge learning needs (meds, wound care, etc )  - Arrange for interpretive services to assist at discharge as needed  - Refer to Case Management Department for coordinating discharge planning if the patient needs post-hospital services based on physician/advanced practitioner order or complex needs related to functional status, cognitive ability, or social support system  10/7/2022 1121 by Jayjay Gold RN  Outcome: Adequate for Discharge  10/7/2022 0728 by Lee Ann Rouse RN  Outcome: Progressing     Problem: Knowledge Deficit  Goal: Patient/family/caregiver demonstrates understanding of disease process, treatment plan, medications, and discharge instructions  Description: Complete learning assessment and assess knowledge base    Interventions:  - Provide teaching at level of understanding  - Provide teaching via preferred learning methods  10/7/2022 1121 by Lee Ann Rouse RN  Outcome: Adequate for Discharge  10/7/2022 7834 by Lee Ann Rouse RN  Outcome: Progressing

## 2022-10-07 NOTE — ASSESSMENT & PLAN NOTE
· Presents from home with sore throat , chills, malaise for over 1 week  In ED tested COVID + 10/5/22  · Reports COVID vaccinated x 3    · + sepsis criteria   · XR chest negative for pneumonia  · Admit to mild COVID pathway, currently on room air  · D-dimer 1 27, age appropriate   · DVT ppx Heparin SQ   · Received Rocephin , remdesivir  · Continue supportive care, encourage prone , incentive spirometry   · Will discharge home with supportive care today

## 2022-10-07 NOTE — ASSESSMENT & PLAN NOTE
· Reported episode of nausea and vomiting day of admission   No abdominal pain, tolerating PO intake now    · Likely secondary to acute infection (COVID 19, acute cystitis )

## 2022-10-08 LAB
BACTERIA BLD CULT: ABNORMAL
BACTERIA BLD CULT: ABNORMAL
BACTERIA UR CULT: ABNORMAL
BACTERIA UR CULT: ABNORMAL
E COLI DNA BLD POS QL NAA+NON-PROBE: DETECTED
GRAM STN SPEC: ABNORMAL
GRAM STN SPEC: ABNORMAL

## 2022-10-10 NOTE — UTILIZATION REVIEW
Notification of Discharge   This is a Notification of Discharge from our facility [address]  Please be advised that this patient has been discharge from our facility  Below you will find the admission and discharge date and time including the patient’s disposition  UTILIZATION REVIEW CONTACT:  JOSE ALFREDO Sims  Utilization   Network Utilization Review Department  Phone: [phone] x carefully listen to the prompts  All voicemails are confidential   Email: [email or web address]     PHYSICIAN ADVISORY SERVICES:  FOR [id number] REVIEW - MEDICAL NECESSITY DENIAL  Phone: [phone]  Fax: [phone]  Email: [email or web address]     PRESENTATION DATE: 10/5/2022  8:19 PM  OBERVATION ADMISSION DATE:   INPATIENT ADMISSION DATE: 10/5/22 10:20 PM   DISCHARGE DATE: 10/7/2022 12:00 PM  DISPOSITION: Home/Self Care Home/Self Care      IMPORTANT INFORMATION:  Send all requests for admission clinical reviews, approved or denied determinations and any other requests to dedicated fax number below belonging to the campus where the patient is receiving treatment   List of dedicated fax numbers:  [address] DENIALS (Administrative/Medical Necessity) [phone]   [address]  (Maternity/NICU/Pediatrics) [phone]   [facility] [phone]   [facility] [address] [phone]   [address] [phone]   [address] [facility] [phone]   [address] [facility] [phone]   [address] [place] [address] 119 [phone]   [facility]  [phone] [address] [facility] [phone]   [address] [facility] [address] [facility] [phone]

## 2022-12-05 PROBLEM — N30.01 ACUTE CYSTITIS WITH HEMATURIA: Status: RESOLVED | Noted: 2022-10-06 | Resolved: 2022-12-05

## 2023-01-28 ENCOUNTER — HOSPITAL ENCOUNTER (OUTPATIENT)
Dept: NON INVASIVE DIAGNOSTICS | Facility: HOSPITAL | Age: 73
Discharge: HOME | End: 2023-01-28

## 2023-01-28 VITALS
WEIGHT: 170 LBS | HEIGHT: 63 IN | HEART RATE: 70 BPM | DIASTOLIC BLOOD PRESSURE: 66 MMHG | BODY MASS INDEX: 30.12 KG/M2 | SYSTOLIC BLOOD PRESSURE: 115 MMHG

## 2023-01-28 DIAGNOSIS — I35.0 NONRHEUMATIC AORTIC (VALVE) STENOSIS: ICD-10-CM

## 2023-01-28 LAB
AORTIC ROOT: 3.1 CM
AORTIC VALVE MEAN VELOCITY: 24.4 M/S
APICAL FOUR CHAMBER EJECTION FRACTION: 68 %
AV AREA BY CONTINUOUS VTI: 0.9 CM2
AV AREA PEAK VELOCITY: 1.3 CM2
AV LVOT MEAN GRADIENT: 2 MMHG
AV LVOT PEAK GRADIENT: 3 MMHG
AV MEAN GRADIENT: 27 MMHG
AV PEAK GRADIENT: 48 MMHG
DOP CALC AO PEAK VEL: 3.39 M/S
DOP CALC AO VTI: 60.83 CM
DOP CALC LVOT DIAMETER: 2.1 CM
DOP CALC LVOT PEAK VEL VTI: 15.03 CM
DOP CALC LVOT PEAK VEL: 0.87 M/S
DOP CALC LVOT STROKE INDEX: 29.8 ML/M2
DOP CALC LVOT STROKE VOLUME: 54
E WAVE DECELERATION TIME: 173 MS
E/A RATIO: 0.7
FRACTIONAL SHORTENING: 40 (ref 28–44)
INTERVENTRICULAR SEPTUM IN DIASTOLE (PARASTERNAL SHORT AXIS VIEW): 0.9 CM
INTERVENTRICULAR SEPTUM: 0.9 CM (ref 0.6–1.1)
LAAS-AP2: 10.4 CM2
LAAS-AP4: 17.5 CM2
LEFT ATRIUM SIZE: 3.3 CM
LEFT ATRIUM VOLUME INDEX (MOD BIPLANE): 16
LEFT INTERNAL DIMENSION IN SYSTOLE: 2.6 CM (ref 2.1–4)
LEFT VENTRICLE DIASTOLIC VOLUME (MOD BIPLANE): 56 ML
LEFT VENTRICLE SYSTOLIC VOLUME (MOD BIPLANE): 18 ML
LEFT VENTRICULAR INTERNAL DIMENSION IN DIASTOLE: 4.3 CM (ref 3.5–6)
LEFT VENTRICULAR POSTERIOR WALL IN END DIASTOLE: 0.9 CM
LEFT VENTRICULAR STROKE VOLUME: 58 ML
LV EF: 67 %
LVSV (TEICH): 58 ML
MV E'TISSUE VEL-LAT: 9 CM/S
MV E'TISSUE VEL-SEP: 6 CM/S
MV PEAK A VEL: 1.03 M/S
MV PEAK E VEL: 72 CM/S
MV STENOSIS PRESSURE HALF TIME: 50 MS
MV VALVE AREA P 1/2 METHOD: 4.4
RIGHT ATRIAL 2D VOLUME: 19 ML
RIGHT ATRIUM AREA SYSTOLE A4C: 10 CM2
RIGHT VENTRICLE ID DIMENSION: 3.3 CM
SL CV LEFT ATRIUM LENGTH A2C: 4.7 CM
SL CV PED ECHO LEFT VENTRICLE DIASTOLIC VOLUME (MOD BIPLANE) 2D: 83 ML
SL CV PED ECHO LEFT VENTRICLE SYSTOLIC VOLUME (MOD BIPLANE) 2D: 25 ML

## 2023-02-01 LAB
AORTIC ROOT: 3.1 CM
AORTIC VALVE MEAN VELOCITY: 24.4 M/S
APICAL FOUR CHAMBER EJECTION FRACTION: 68 %
AV AREA BY CONTINUOUS VTI: 0.9 CM2
AV AREA PEAK VELOCITY: 1.3 CM2
AV LVOT MEAN GRADIENT: 2 MMHG
AV LVOT PEAK GRADIENT: 3 MMHG
AV MEAN GRADIENT: 48 MMHG
AV PEAK GRADIENT: 79 MMHG
AV VALVE AREA: 0.59 CM2
AV VELOCITY RATIO: 0.19
DOP CALC AO PEAK VEL: 4.5 M/S
DOP CALC AO VTI: 87.8 CM
DOP CALC LVOT AREA: 3.46 CM2
DOP CALC LVOT DIAMETER: 2.1 CM
DOP CALC LVOT PEAK VEL VTI: 15.03 CM
DOP CALC LVOT PEAK VEL: 0.87 M/S
DOP CALC LVOT STROKE INDEX: 29.8 ML/M2
DOP CALC LVOT STROKE VOLUME: 52.03 CM3
E WAVE DECELERATION TIME: 173 MS
FRACTIONAL SHORTENING: 40 (ref 28–44)
INTERVENTRICULAR SEPTUM IN DIASTOLE (PARASTERNAL SHORT AXIS VIEW): 0.9 CM
INTERVENTRICULAR SEPTUM: 0.9 CM (ref 0.6–1.1)
LAAS-AP2: 10.4 CM2
LAAS-AP4: 17.5 CM2
LEFT ATRIUM SIZE: 3.3 CM
LEFT INTERNAL DIMENSION IN SYSTOLE: 2.6 CM (ref 2.1–4)
LEFT VENTRICLE DIASTOLIC VOLUME (MOD BIPLANE): 56 ML
LEFT VENTRICLE SYSTOLIC VOLUME (MOD BIPLANE): 18 ML
LEFT VENTRICULAR INTERNAL DIMENSION IN DIASTOLE: 4.3 CM (ref 3.5–6)
LEFT VENTRICULAR POSTERIOR WALL IN END DIASTOLE: 0.9 CM
LEFT VENTRICULAR STROKE VOLUME: 58 ML
LV EF: 67 %
LVSV (TEICH): 58 ML
MV E'TISSUE VEL-LAT: 9 CM/S
MV E'TISSUE VEL-SEP: 6 CM/S
MV PEAK A VEL: 1.03 M/S
MV PEAK E VEL: 72 CM/S
MV STENOSIS PRESSURE HALF TIME: 50 MS
MV VALVE AREA P 1/2 METHOD: 4.4
RA PRESSURE ESTIMATED: 3 MMHG
RIGHT ATRIAL 2D VOLUME: 19 ML
RIGHT ATRIUM AREA SYSTOLE A4C: 10 CM2
RIGHT VENTRICLE ID DIMENSION: 3.3 CM
SL CV LEFT ATRIUM LENGTH A2C: 4.7 CM
SL CV LV EF: 70
SL CV PED ECHO LEFT VENTRICLE DIASTOLIC VOLUME (MOD BIPLANE) 2D: 83 ML
SL CV PED ECHO LEFT VENTRICLE SYSTOLIC VOLUME (MOD BIPLANE) 2D: 25 ML
TRICUSPID ANNULAR PLANE SYSTOLIC EXCURSION: 1.6 CM

## 2023-02-04 ENCOUNTER — HOSPITAL ENCOUNTER (OUTPATIENT)
Dept: NON INVASIVE DIAGNOSTICS | Facility: HOSPITAL | Age: 73
Discharge: HOME | End: 2023-02-04
Attending: INTERNAL MEDICINE

## 2023-02-04 VITALS
HEIGHT: 63 IN | WEIGHT: 170 LBS | HEART RATE: 74 BPM | SYSTOLIC BLOOD PRESSURE: 115 MMHG | DIASTOLIC BLOOD PRESSURE: 66 MMHG | BODY MASS INDEX: 30.12 KG/M2

## 2023-02-04 DIAGNOSIS — I35.0 NONRHEUMATIC AORTIC (VALVE) STENOSIS: ICD-10-CM

## 2023-02-04 LAB
AORTIC VALVE MEAN VELOCITY: 24.1 M/S
AV AREA BY CONTINUOUS VTI: 1.1 CM2
AV AREA PEAK VELOCITY: 1.2 CM2
AV LVOT MEAN GRADIENT: 3 MMHG
AV LVOT PEAK GRADIENT: 4 MMHG
AV MEAN GRADIENT: 26 MMHG
AV PEAK GRADIENT: 47 MMHG
AV VALVE AREA: 1.1 CM2
AV VELOCITY RATIO: 0.29
DOP CALC AO PEAK VEL: 3.42 M/S
DOP CALC AO VTI: 63.51 CM
DOP CALC LVOT AREA: 3.14 CM2
DOP CALC LVOT DIAMETER: 2 CM
DOP CALC LVOT PEAK VEL VTI: 22.33 CM
DOP CALC LVOT PEAK VEL: 1 M/S
DOP CALC LVOT STROKE INDEX: 37.2 ML/M2
DOP CALC LVOT STROKE VOLUME: 70.12

## 2023-02-10 NOTE — PROGRESS NOTES
PT Evaluation     Today's date: 2023  Patient name: Sugar Venegas  : 1950  MRN: [id number]  Referring provider: Juan Manuel Hollis MD  Dx:   Encounter Diagnosis     ICD-10-CM    1  Osteoarthritis of spine with radiculopathy, cervical region  M47 22                      Assessment  Assessment details: Pt is a 81year old female who presents to OP PT for cervical radiculopathy and OA of cervical spine  She reports that start of numbness in fingers 1-3 of her R hand several months ago, she does have a history of carpal tunnel release surgery  She demonstrates a (-) cervical radiculopathy cluster; DD at this time is CTS vs other peripheral nerve entrapment  [facility], [facility] (-) Spurlings, (-) ULTT, diminshed sensation of fingers 1-3 of R hand, and decreased  strength of R hand  Due to her current impairments patient has difficulty with fine motor tasks such as gripping and any prolonged activity that requires fine motor skills  Pt would benefit from OP PT services in order to address current impairments and functional limitations  Thank you for your referral!    Impairments: activity intolerance, impaired physical strength and lacks appropriate home exercise program    Goals  STG (to be met within 4 weeks):  1  Pt to improve pain level at worst no more than 1/10 to improve function  2  Pt to improve R  strength by at least 10 pounds in order to help with grabbing activities  3  Pt to improve FOTO score by at least 10 points in order to improve QOL    LTG (to be met within 10 weeks):  1  Pt will report no pain with activity  2  Pt will restore WFL wrist R strength  3  Pt will restore WFL wrist R  strength  4   Pt to meet FOTO d/c score    Plan  Patient would benefit from: skilled physical therapy  Planned modality interventions: thermotherapy: hydrocollator packs and cryotherapy  Planned therapy interventions: joint mobilization, manual therapy, neuromuscular re-education, patient education, strengthening, stretching, therapeutic exercise, home exercise program and balance  Frequency: 1x week  Duration in weeks: 6  Treatment plan discussed with: patient        Subjective Evaluation    History of Present Illness  Mechanism of injury: Pt notes the start of her symptoms over the past 6 months into her  fingers 1-3 of R hand, denies any symptoms in her thenar eminence  She notes that fine motor tasks are the most difficult such as sewing and playing games on her Ipad  Denies any pain, mostly just numbness and tingling  She did have carpal tunnel surgery release many years ago    Pain  No pain reported  Aggravating factors: overhead activity and lifting    Hand dominance: right    Treatments  Current treatment: medication and physical therapy  Patient Goals  Patient goals for therapy: increased strength, independence with ADLs/IADLs and return to sport/leisure activities          Objective     Tenderness     Additional Tenderness Details  (-) tenderness    Neurological Testing     Sensation   Cervical/Thoracic   Left   Intact: light touch    Right   Intact: light touch    Wrist/Hand   Left   Intact: light touch    Right   Intact: light touch  Diminished: light touch    Comments   Right light touch: Diminshed sensation in fingers 1-3 on volar side    Active Range of Motion   Cervical/Thoracic Spine       Cervical    Flexion:  WFL  Extension:  WFL  Left lateral flexion:  WFL  Right lateral flexion:  WFL  Left rotation:  WFL  Right rotation:  WFL    Left Wrist   Normal active range of motion    Right Wrist   Normal active range of motion    Strength/Myotome Testing     Left Shoulder     Planes of Motion   Flexion: 4-   Abduction: 4-     Right Shoulder     Planes of Motion   Flexion: 3+   Abduction: 4-     Left Elbow   Flexion: 4  Extension: 4+    Right Elbow   Flexion: 4  Extension: 4+    Left Wrist/Hand   Wrist extension: 4+  Wrist flexion: 4+  Thumb extension: 4+     (2nd hand position)     Trial 1: 38    Trial 2: 35    Right Wrist/Hand   Wrist extension: 4+  Wrist flexion: 4+  Thumb extension: 4+     (2nd hand position)     Trial 1: 34    Trial 2: 32    Tests   Cervical     Left   Negative Spurling's Test A  Right   Negative Spurling's Test A  Left Shoulder   Negative ULTT1  Right Shoulder   Negative ULTT1  Precautions: HTN    Manuals 2/13            Radiocarpal/Radioulnar mobz                          Neuro Re-Ed             Putty gripping             Putty pinching             Putty pinch and pull                                       Ther Ex             UBE             Wrist flexion/extension stretch             Genie ball             Wrist AROM sup/pro             Weighted ball suspension holds             Pball Presses                          Instructed HEP & education 10'                         Modalities                                         Access Code: [email or web address]  URL: [email or web address]  Date: 02/13/2023  Prepared by: Tanya Corner    Exercises  • Putty Squeezes - 2 x daily - 7 x weekly - 2 sets - 10 reps  • Finger Pinch and Pull with Putty - 2 x daily - 7 x weekly - 2 sets - 10 reps  • Seated Wrist Extension Stretch - 2 x daily - 7 x weekly - 2 sets - 10 reps

## 2023-02-13 ENCOUNTER — EVALUATION (OUTPATIENT)
Dept: PHYSICAL THERAPY | Facility: CLINIC | Age: 73
End: 2023-02-13

## 2023-02-13 DIAGNOSIS — M47.22 OSTEOARTHRITIS OF SPINE WITH RADICULOPATHY, CERVICAL REGION: Primary | ICD-10-CM

## 2023-02-13 NOTE — LETTER
2023    Renny Rocha MD  [facility] [address] [address]    Patient: Claudia Zamora   YOB: 1950   Date of Visit: 2023     Encounter Diagnosis     ICD-10-CM    1  Osteoarthritis of spine with radiculopathy, cervical region  M47 22           Dear Dr Ward Rodney: Thank you for your recent referral of Claudia Zamora  Please review the attached evaluation summary from Afsaneh's recent visit  Please verify that you agree with the plan of care by signing the attached order  If you have any questions or concerns, please do not hesitate to call  I sincerely appreciate the opportunity to share in the care of one of your patients and hope to have another opportunity to work with you in the near future  Sincerely,    Michael Mulligan, PT      Referring Provider:      I certify that I have read the below Plan of Care and certify the need for these services furnished under this plan of treatment while under my care  Renny Rocha MD  [facility] [address] [address]  Via Fax: [phone]          PT Evaluation     Today's date: 2023  Patient name: Claudia Zamora  : 1950  MRN: [id number]  Referring provider: Brandy Perez MD  Dx:   Encounter Diagnosis     ICD-10-CM    1  Osteoarthritis of spine with radiculopathy, cervical region  M47 22                      Assessment  Assessment details: Pt is a 81year old female who presents to OP PT for cervical radiculopathy and OA of cervical spine  She reports that start of numbness in fingers 1-3 of her R hand several months ago, she does have a history of carpal tunnel release surgery  She demonstrates a (-) cervical radiculopathy cluster; DD at this time is CTS vs other peripheral nerve entrapment  Upon examination, patient presents with [facility] cervical and wrist ROM, (-) Spurlings, (-) ULTT, diminshed sensation of fingers 1-3 of R hand, and decreased  strength of R hand   Due to her current impairments patient has difficulty with fine motor tasks such as gripping and any prolonged activity that requires fine motor skills  Pt would benefit from OP PT services in order to address current impairments and functional limitations  Thank you for your referral!    Impairments: activity intolerance, impaired physical strength and lacks appropriate home exercise program    Goals  STG (to be met within 4 weeks):  1  Pt to improve pain level at worst no more than 1/10 to improve function  2  Pt to improve R  strength by at least 10 pounds in order to help with grabbing activities  3  Pt to improve FOTO score by at least 10 points in order to improve QOL    LTG (to be met within 10 weeks):  1  Pt will report no pain with activity  2  Pt will restore WFL wrist R strength  3  Pt will restore WFL wrist R  strength  4  Pt to meet FOTO d/c score    Plan  Patient would benefit from: skilled physical therapy  Planned modality interventions: thermotherapy: hydrocollator packs and cryotherapy  Planned therapy interventions: joint mobilization, manual therapy, neuromuscular re-education, patient education, strengthening, stretching, therapeutic exercise, home exercise program and balance  Frequency: 1x week  Duration in weeks: 6  Treatment plan discussed with: patient        Subjective Evaluation    History of Present Illness  Mechanism of injury: Pt notes the start of her symptoms over the past 6 months into her  fingers 1-3 of R hand, denies any symptoms in her thenar eminence  She notes that fine motor tasks are the most difficult such as sewing and playing games on her Ipad  Denies any pain, mostly just numbness and tingling  She did have carpal tunnel surgery release many years ago    Pain  No pain reported  Aggravating factors: overhead activity and lifting    Hand dominance: right    Treatments  Current treatment: medication and physical therapy  Patient Goals  Patient goals for therapy: increased strength, independence with ADLs/IADLs and return to sport/leisure activities          Objective     Tenderness     Additional Tenderness Details  (-) tenderness    Neurological Testing     Sensation   Cervical/Thoracic   Left   Intact: light touch    Right   Intact: light touch    Wrist/Hand   Left   Intact: light touch    Right   Intact: light touch  Diminished: light touch    Comments   Right light touch: Diminshed sensation in fingers 1-3 on volar side    Active Range of Motion   Cervical/Thoracic Spine       Cervical    Flexion:  WFL  Extension:  WFL  Left lateral flexion:  WFL  Right lateral flexion:  WFL  Left rotation:  WFL  Right rotation:  WFL    Left Wrist   Normal active range of motion    Right Wrist   Normal active range of motion    Strength/Myotome Testing     Left Shoulder     Planes of Motion   Flexion: 4-   Abduction: 4-     Right Shoulder     Planes of Motion   Flexion: 3+   Abduction: 4-     Left Elbow   Flexion: 4  Extension: 4+    Right Elbow   Flexion: 4  Extension: 4+    Left Wrist/Hand   Wrist extension: 4+  Wrist flexion: 4+  Thumb extension: 4+     (2nd hand position)     Trial 1: 38    Trial 2: 35    Right Wrist/Hand   Wrist extension: 4+  Wrist flexion: 4+  Thumb extension: 4+     (2nd hand position)     Trial 1: 34    Trial 2: 32    Tests   Cervical     Left   Negative Spurling's Test A  Right   Negative Spurling's Test A  Left Shoulder   Negative ULTT1  Right Shoulder   Negative ULTT1               Precautions: HTN    Manuals 2/13            Radiocarpal/Radioulnar mobz                          Neuro Re-Ed             Putty gripping             Putty pinching             Putty pinch and pull                                       Ther Ex             UBE             Wrist flexion/extension stretch             Genie ball             Wrist AROM sup/pro             Weighted ball suspension holds             Pball Presses                          Instructed HEP & education 10'                         Modalities                                         Access Code: [email or web address]  URL: [email or web address]  Date: 02/13/2023  Prepared by: Hemanth Brody    Exercises  • Putty Squeezes - 2 x daily - 7 x weekly - 2 sets - 10 reps  • Finger Pinch and Pull with Putty - 2 x daily - 7 x weekly - 2 sets - 10 reps  • Seated Wrist Extension Stretch - 2 x daily - 7 x weekly - 2 sets - 10 reps

## 2023-02-22 ENCOUNTER — OFFICE VISIT (OUTPATIENT)
Dept: PHYSICAL THERAPY | Facility: CLINIC | Age: 73
End: 2023-02-22

## 2023-02-22 DIAGNOSIS — M47.22 OSTEOARTHRITIS OF SPINE WITH RADICULOPATHY, CERVICAL REGION: Primary | ICD-10-CM

## 2023-02-22 NOTE — PROGRESS NOTES
Daily Note     Today's date: 2023  Patient name: Bill Alcantar  : 1950  MRN: [id number]  Referring provider: Maureen Zamudio MD  Dx:   Encounter Diagnosis     ICD-10-CM    1  Osteoarthritis of spine with radiculopathy, cervical region  M47 22                      Subjective: Pt reports that her numbness/tingling has been slightly improving      Objective: See treatment diary below      Assessment:  Pt tolerated treatment session fairly well  PT notes limited supination/pronation ROM; WFL flexion/extension ROM  She reports some discomfort at end ranges of motion; no worsening of numbness or tingling into fingers of R hand  Pt would benefit from continued OP PT services  Plan: Continue per plan of care  Precautions: HTN    Manuals            Radiocarpal/Radioulnar mobz  10'                        Neuro Re-Ed             Putty gripping  Red 3'           Putty pinching  Red 3'           Putty pinch and pull  Red 3'                                     Ther Ex             UBE  10' 1 1 alt           Wrist flexion/extension stretch  :15x3 ea           Genie ball  10x bilat           Wrist AROM sup/pro  Hammer 10x            Weighted ball suspension holds  Green 15x :03           Pball Presses                          Instructed HEP & education 10'                         Modalities               Deferred                          Access Code: [email or web address]  URL: [email or web address]  Date: 2023  Prepared by: Ira Aguirre    Exercises  • Putty Squeezes - 2 x daily - 7 x weekly - 2 sets - 10 reps  • Finger Pinch and Pull with Putty - 2 x daily - 7 x weekly - 2 sets - 10 reps  • Seated Wrist Extension Stretch - 2 x daily - 7 x weekly - 2 sets - 10 reps

## 2023-02-28 NOTE — PROGRESS NOTES
Daily Note     Today's date: 3/1/2023  Patient name: Chavez Sr  : 1950  MRN: [id number]  Referring provider: Michael Avalos MD  Dx:   Encounter Diagnosis     ICD-10-CM    1  Osteoarthritis of spine with radiculopathy, cervical region  M47 22                      Subjective: Pt notes no new complaints      Objective: See treatment diary below      Assessment:  Pt tolerated treatment session well  She reports no increase in symptoms; PT notes continued ROM and strength limitations  Pt would benefit from continued OP PT services  Plan: Continue per plan of care  Precautions: HTN    Manuals 2/13 2/22 3/1          Radiocarpal/Radioulnar mobz  10' 10'                       Neuro Re-Ed             Putty gripping  Red 3' Red 3'          Putty pinching  Red 3' Red 3'          Putty pinch and pull  Red 3' Red 3'                                    Ther Ex             UBE  10' 1 1 alt 10' 1 1 alt          Wrist flexion/extension stretch  :15x3 ea :15x3 ea          Genie ball  10x bilat 10x bilat          Wrist AROM sup/pro  Hammer 10x  Hammer 10x           Weighted ball suspension holds  Green 15x :03 Green 15x :03 palm down and twist          Pball Presses                          Instructed HEP & education 10'                         Modalities               Deferred                          Access Code: [email or web address]  URL: [email or web address]  Date: 2023  Prepared by: Nae Chavarria    Exercises  • Putty Squeezes - 2 x daily - 7 x weekly - 2 sets - 10 reps  • Finger Pinch and Pull with Putty - 2 x daily - 7 x weekly - 2 sets - 10 reps  • Seated Wrist Extension Stretch - 2 x daily - 7 x weekly - 2 sets - 10 reps

## 2023-03-01 ENCOUNTER — OFFICE VISIT (OUTPATIENT)
Dept: PHYSICAL THERAPY | Facility: CLINIC | Age: 73
End: 2023-03-01

## 2023-03-01 DIAGNOSIS — M47.22 OSTEOARTHRITIS OF SPINE WITH RADICULOPATHY, CERVICAL REGION: Primary | ICD-10-CM

## 2023-03-09 NOTE — PROGRESS NOTES
Office contacted pt and she reports she will be scheduled for surgery  Will be d/c from [facility]

## 2023-03-10 ENCOUNTER — APPOINTMENT (OUTPATIENT)
Dept: PHYSICAL THERAPY | Facility: CLINIC | Age: 73
End: 2023-03-10

## 2023-06-12 ENCOUNTER — HOSPITAL ENCOUNTER (OUTPATIENT)
Dept: RADIOLOGY | Facility: CLINIC | Age: 73
Discharge: HOME | End: 2023-06-12
Payer: COMMERCIAL

## 2023-06-12 VITALS — HEIGHT: 63 IN | WEIGHT: 175 LBS | BODY MASS INDEX: 31.01 KG/M2

## 2023-06-12 DIAGNOSIS — Z12.31 ENCOUNTER FOR SCREENING MAMMOGRAM FOR MALIGNANT NEOPLASM OF BREAST: ICD-10-CM

## 2023-06-12 PROCEDURE — 77063 BREAST TOMOSYNTHESIS BI: CPT

## 2023-06-12 PROCEDURE — 77067 SCR MAMMO BI INCL CAD: CPT

## 2023-08-14 ENCOUNTER — HOSPITAL ENCOUNTER (OUTPATIENT)
Dept: NON INVASIVE DIAGNOSTICS | Facility: HOSPITAL | Age: 73
Discharge: HOME | End: 2023-08-14
Attending: INTERNAL MEDICINE
Payer: COMMERCIAL

## 2023-08-14 VITALS
SYSTOLIC BLOOD PRESSURE: 115 MMHG | DIASTOLIC BLOOD PRESSURE: 66 MMHG | HEIGHT: 63 IN | WEIGHT: 175 LBS | HEART RATE: 76 BPM | BODY MASS INDEX: 31.01 KG/M2

## 2023-08-14 DIAGNOSIS — I35.0 NONRHEUMATIC AORTIC (VALVE) STENOSIS: ICD-10-CM

## 2023-08-14 PROCEDURE — 93325 DOPPLER ECHO COLOR FLOW MAPG: CPT

## 2023-08-14 PROCEDURE — 93321 DOPPLER ECHO F-UP/LMTD STD: CPT | Performed by: INTERNAL MEDICINE

## 2023-08-14 PROCEDURE — 93308 TTE F-UP OR LMTD: CPT | Performed by: INTERNAL MEDICINE

## 2023-08-14 PROCEDURE — 93321 DOPPLER ECHO F-UP/LMTD STD: CPT

## 2023-08-14 PROCEDURE — 93325 DOPPLER ECHO COLOR FLOW MAPG: CPT | Performed by: INTERNAL MEDICINE

## 2023-08-14 PROCEDURE — 93308 TTE F-UP OR LMTD: CPT

## 2023-08-15 LAB
AORTIC VALVE MEAN VELOCITY: 20.7 M/S
APICAL FOUR CHAMBER EJECTION FRACTION: 79 %
AV AREA BY CONTINUOUS VTI: 1.4 CM2
AV AREA PEAK VELOCITY: 1.2 CM2
AV LVOT MEAN GRADIENT: 3 MMHG
AV LVOT PEAK GRADIENT: 4 MMHG
AV MEAN GRADIENT: 19 MMHG
AV PEAK GRADIENT: 31 MMHG
DOP CALC AO PEAK VEL: 2.76 M/S
DOP CALC AO VTI: 55.57 CM
DOP CALC LVOT CARDIAC INDEX: 1.74 L/MIN/M2
DOP CALC LVOT CARDIAC OUTPUT: 3.18 L/MIN
DOP CALC LVOT DIAMETER: 2 CM
DOP CALC LVOT PEAK VEL VTI: 24.96 CM
DOP CALC LVOT PEAK VEL: 1.05 M/S
DOP CALC LVOT STROKE INDEX: 43.2 ML/M2
DOP CALC LVOT STROKE VOLUME: 79
E WAVE DECELERATION TIME: 183 MS
E/A RATIO: 0.6
FRACTIONAL SHORTENING: 33 (ref 28–44)
INTERVENTRICULAR SEPTUM IN DIASTOLE (PARASTERNAL SHORT AXIS VIEW): 1 CM
INTERVENTRICULAR SEPTUM: 1 CM (ref 0.6–1.1)
IVC: 11 MM
LEFT INTERNAL DIMENSION IN SYSTOLE: 3 CM (ref 2.1–4)
LEFT VENTRICLE DIASTOLIC VOLUME (MOD BIPLANE): 53 ML
LEFT VENTRICLE SYSTOLIC VOLUME (MOD BIPLANE): 12 ML
LEFT VENTRICULAR INTERNAL DIMENSION IN DIASTOLE: 4.5 CM (ref 3.5–6)
LEFT VENTRICULAR POSTERIOR WALL IN END DIASTOLE: 1 CM
LEFT VENTRICULAR STROKE VOLUME: 57 ML
LV EF: 77 %
LVSV (TEICH): 57 ML
MV E'TISSUE VEL-LAT: 6 CM/S
MV E'TISSUE VEL-SEP: 6 CM/S
MV PEAK A VEL: 0.82 M/S
MV PEAK E VEL: 49 CM/S
MV STENOSIS PRESSURE HALF TIME: 53 MS
MV VALVE AREA P 1/2 METHOD: 4.2
RA PRESSURE ESTIMATED: 3 MMHG
SL CV LV EF: 77
SL CV PED ECHO LEFT VENTRICLE DIASTOLIC VOLUME (MOD BIPLANE) 2D: 93 ML
SL CV PED ECHO LEFT VENTRICLE SYSTOLIC VOLUME (MOD BIPLANE) 2D: 36 ML

## 2023-08-16 ENCOUNTER — HOSPITAL ENCOUNTER (OUTPATIENT)
Dept: RADIOLOGY | Facility: HOSPITAL | Age: 73
Discharge: HOME | End: 2023-08-16
Payer: COMMERCIAL

## 2023-08-16 DIAGNOSIS — R07.81 RIB PAIN ON RIGHT SIDE: ICD-10-CM

## 2023-08-16 DIAGNOSIS — M25.561 RIGHT KNEE PAIN, UNSPECIFIED CHRONICITY: ICD-10-CM

## 2023-08-16 DIAGNOSIS — M17.11 PRIMARY OSTEOARTHRITIS OF RIGHT KNEE: ICD-10-CM

## 2023-08-16 PROCEDURE — 73562 X-RAY EXAM OF KNEE 3: CPT

## 2023-08-16 PROCEDURE — 71101 X-RAY EXAM UNILAT RIBS/CHEST: CPT

## 2023-10-31 ENCOUNTER — HOSPITAL ENCOUNTER (OUTPATIENT)
Dept: NON INVASIVE DIAGNOSTICS | Facility: HOSPITAL | Age: 73
Discharge: HOME | End: 2023-10-31
Attending: INTERNAL MEDICINE
Payer: COMMERCIAL

## 2023-10-31 ENCOUNTER — HOSPITAL ENCOUNTER (OUTPATIENT)
Dept: NUCLEAR MEDICINE | Facility: HOSPITAL | Age: 73
Discharge: HOME | End: 2023-10-31
Attending: INTERNAL MEDICINE
Payer: COMMERCIAL

## 2023-10-31 DIAGNOSIS — I20.89 OTHER FORMS OF ANGINA PECTORIS: ICD-10-CM

## 2023-10-31 LAB
ARRHY DURING EX: NORMAL
CHEST PAIN STATEMENT: NORMAL
MAX DIASTOLIC BP: 78 MMHG
MAX HR: 114 BPM
MAX PREDICTED HEART RATE: 149 BPM
NUC STRESS EJECTION FRACTION: 78 %
PROTOCOL NAME: NORMAL
RATE PRESSURE PRODUCT: NORMAL
REASON FOR TERMINATION: NORMAL
SL CV REST NUCLEAR ISOTOPE DOSE: 11 MCI
SL CV STRESS NUCLEAR ISOTOPE DOSE: 33 MCI
SL CV STRESS RECOVERY BP: NORMAL MMHG
SL CV STRESS RECOVERY HR: 94 BPM
SL CV STRESS RECOVERY O2 SAT: 100 %
STRESS ANGINA INDEX: 0
STRESS BASELINE BP: NORMAL MMHG
STRESS BASELINE HR: 81 BPM
STRESS O2 SAT REST: 98 %
STRESS PEAK HR: 114 BPM
STRESS POST EXERCISE DUR MIN: 3 MIN
STRESS POST EXERCISE DUR MIN: 3 MIN
STRESS POST EXERCISE DUR SEC: 0 SEC
STRESS POST EXERCISE DUR SEC: 0 SEC
STRESS POST O2 SAT PEAK: 98 %
STRESS POST PEAK BP: 151 MMHG
STRESS POST PEAK HR: 114 BPM
STRESS POST PEAK SYSTOLIC BP: 153 MMHG
STRESS/REST PERFUSION RATIO: 0.98
TARGET HR FORMULA: NORMAL
TEST INDICATION: NORMAL

## 2023-10-31 PROCEDURE — 93017 CV STRESS TEST TRACING ONLY: CPT

## 2023-10-31 PROCEDURE — 78452 HT MUSCLE IMAGE SPECT MULT: CPT

## 2023-10-31 PROCEDURE — A9502 TC99M TETROFOSMIN: HCPCS

## 2023-10-31 RX ORDER — REGADENOSON 0.08 MG/ML
0.4 INJECTION, SOLUTION INTRAVENOUS ONCE
Status: COMPLETED | OUTPATIENT
Start: 2023-10-31 | End: 2023-10-31

## 2023-10-31 RX ADMIN — REGADENOSON 0.4 MG: 0.08 INJECTION, SOLUTION INTRAVENOUS at 10:10

## 2023-11-01 NOTE — ASSESSMENT & PLAN NOTE
· Sepsis criteria present on admission as evidenced by tachycardia, leukocytosis WBC 21 4, COVID+, UA abnormal   · Blood cultures showing Gram-negative rods resembling E coli  Urine culture showing E coli    · Afebrile last 24 hours  Leukocytosis is improving    Received 2 doses of IV Rocephin will discharge on oral Ceftin to complete a total 7 day course Quality 110: Preventive Care And Screening: Influenza Immunization: Influenza immunization was not ordered or administered, reason not given Quality 431: Preventive Care And Screening: Unhealthy Alcohol Use - Screening: Patient not identified as an unhealthy alcohol user when screened for unhealthy alcohol use using a systematic screening method Detail Level: Detailed Quality 226: Preventive Care And Screening: Tobacco Use: Screening And Cessation Intervention: Patient screened for tobacco use and is an ex/non-smoker Quality 130: Documentation Of Current Medications In The Medical Record: Current Medications Documented

## 2024-01-22 ENCOUNTER — EVALUATION (OUTPATIENT)
Dept: PHYSICAL THERAPY | Facility: CLINIC | Age: 74
End: 2024-01-22
Payer: COMMERCIAL

## 2024-01-22 DIAGNOSIS — M25.561 ACUTE PAIN OF RIGHT KNEE: ICD-10-CM

## 2024-01-22 DIAGNOSIS — Z96.651 S/P TKR (TOTAL KNEE REPLACEMENT), RIGHT: Primary | ICD-10-CM

## 2024-01-22 PROCEDURE — 97161 PT EVAL LOW COMPLEX 20 MIN: CPT

## 2024-01-22 PROCEDURE — 97535 SELF CARE MNGMENT TRAINING: CPT

## 2024-01-22 NOTE — PROGRESS NOTES
PT Evaluation     Today's date: 2024  Patient name: Afsaneh Multani  : 1950  MRN: [id number]  Referring provider: Adria Morales MD  Dx:   Encounter Diagnosis     ICD-10-CM    1. S/P TKR (total knee replacement), right  Z96.651       2. Acute pain of right knee  M25.561                      Assessment  Assessment details: Pt is a 73 year old female who presents to OP PT s/p R TKR on 24. Upon examination, patient presents with pain, decreased LE range of motion, decreased LE strength, impaired function, decreased activity tolerance, and swelling . Due to her current impairments patient has difficulty with transfers, standing, ambulation and functioning at prior level. Pt would benefit from OP PT services in order to address current impairments and functional limitations. Thank you for your referral!      Impairments: abnormal gait, abnormal or restricted ROM, activity intolerance, impaired balance, impaired physical strength, lacks appropriate home exercise program and pain with function    Goals  STG (to be met within 4 weeks):  1. Pt will improve R knee pain to no more than 4/10 at worst in order to improve gait quality  2. Pt will improve R knee ROM by at least 15 * in order to normalize gait pattern  3. Pt will improve quadricep contraction to good in order to improve stability in SL stance  4. Pt will improve R knee strength by at least 1/2 grade in order to negotiate curb step  5. Pt will ambulate without AD in order to maximize independence  6. Pt to improve FOTO score by at least 10 points in order to progress to PLOF    LTG (to bet met in 10 weeks):  1. Pt will be able to perform all activities without R knee pain in order to maximize independence  2. Pt will restore WFL R knee ROM in order to perform all functional activities  3. Pt will restore WFL R knee strength in order to return to hobbies  4. Pt will be able to ascend/descend 1 flight of stairs with reciprocal gait pattern in  order to return PLOF  5. Pt will be able to ambulate on uneven surfaces with least restrictive AD in order to ambulate in the community.  6. Pt to meet FOTO discharge score in order to improve QOL and maximize independence      Plan  Patient would benefit from: skilled physical therapy  Planned modality interventions: thermotherapy: hydrocollator packs and cryotherapy  Planned therapy interventions: joint mobilization, manual therapy, neuromuscular re-education, patient education, strengthening, stretching, therapeutic exercise, home exercise program and balance  Frequency: 2-3x/week.  Duration in weeks: 6  Treatment plan discussed with: patient      Subjective Evaluation    History of Present Illness  Date of surgery: 2024  Mechanism of injury: Patient has been dealing with chronic bilateral knee pain. She had consult in August for elective TKR. Has had many years of injections in both knees, the R knee with no relief and minimal relief in L knee. Currently taking Tramadol for pain management with good relief. Since being home she is walking household distances and is compliant with [facility]  Patient Goals  Patient goals for therapy: independence with ADLs/IADLs, increased strength, increased motion, improved balance, decreased pain, decreased edema and return to sport/leisure activities    Pain  Current pain rating: 3  At best pain rating: 3  At worst pain ratin  Location: Distal lateral thigh  Quality: tight  Aggravating factors: standing, walking and stair climbing    Treatments  Previous treatment: injection treatment  Current treatment: medication and physical therapy        Objective     Observations   Left Knee   Positive for edema, effusion and incision. Negative for drainage.     Tenderness   Left Knee   Tenderness in the lateral joint line and quadriceps tendon. No tenderness in the medial joint line.     Neurological Testing     Sensation     Knee   Left Knee   Intact: Light touch    Right Knee    Intact: light touch     Active Range of Motion   Left Knee   Flexion: 34 degrees   Extension: 8 degrees     Passive Range of Motion   Left Knee   Flexion: 75 degrees   Extension: 3 degrees     Strength/Myotome Testing     Left Knee   Flexion: 3+  Extension: 3-  Quadriceps contraction: fair    Right Knee   Quadriceps contraction: good    Swelling     Left Knee Girth Measurement (cm)   Joint line: 39.5 cm    Right Knee Girth Measurement (cm)   Joint line: 42 cm             Precautions: R TKR, HTN  POC Expiration: 2/22/24  Manuals 1/22       PROM        Patellar Mobility        Scar STM        Bilateral HS, hip ABD, piriformis, gastroc, and quad with manual hip traction                  Neuro Re-Ed        Quad set        QS c SLR        GS c bridge        STS                        TherEx        NuStep to improve ROM/cardiovasc endurance        SAQ/LAQ        SANTI caceres        Step up/down/lateral        Sidestepping with squat        Monster walk        Leg press DLSANTI                                                        Instructed HEP & education 10'                       Gait Training        Ambulation with AD                        Modalities

## 2024-01-22 NOTE — LETTER
2024    Adria Morales MD  [address] [place] [address]    Patient: Afsaneh Multani   YOB: 1950   Date of Visit: 2024     Encounter Diagnosis     ICD-10-CM    1. S/P TKR (total knee replacement), right  Z96.651       2. Acute pain of right knee  M25.561           Dear Dr. Morales:    Thank you for your recent referral of Afsaneh Multani. Please review the attached evaluation summary from Afsaneh's recent visit.     Please verify that you agree with the plan of care by signing the attached order.     If you have any questions or concerns, please do not hesitate to call.     I sincerely appreciate the opportunity to share in the care of one of your patients and hope to have another opportunity to work with you in the near future.       Sincerely,    Jena Roberts, PT      Referring Provider:      I certify that I have read the below Plan of Care and certify the need for these services furnished under this plan of treatment while under my care.                    Adria Morales MD  [address] [place] [id number]  Via Fax: [phone]          PT Evaluation     Today's date: 2024  Patient name: Afsaneh Multani  : 1950  MRN: [id number]  Referring provider: Adria Morales MD  Dx:   Encounter Diagnosis     ICD-10-CM    1. S/P TKR (total knee replacement), right  Z96.651       2. Acute pain of right knee  M25.561                      Assessment  Assessment details: Pt is a 73 year old female who presents to OP PT s/p R TKR on 24. Upon examination, patient presents with pain, decreased LE range of motion, decreased LE strength, impaired function, decreased activity tolerance, and swelling . Due to her current impairments patient has difficulty with transfers, standing, ambulation and functioning at prior level. Pt would benefit from OP PT services in order to address current impairments and  functional limitations. Thank you for your referral!      Impairments: abnormal gait, abnormal or restricted ROM, activity intolerance, impaired balance, impaired physical strength, lacks appropriate home exercise program and pain with function    Goals  STG (to be met within 4 weeks):  1. Pt will improve R knee pain to no more than 4/10 at worst in order to improve gait quality  2. Pt will improve R knee ROM by at least 15 * in order to normalize gait pattern  3. Pt will improve quadricep contraction to good in order to improve stability in SL stance  4. Pt will improve R knee strength by at least 1/2 grade in order to negotiate curb step  5. Pt will ambulate without AD in order to maximize independence  6. Pt to improve FOTO score by at least 10 points in order to progress to PLOF    LTG (to bet met in 10 weeks):  1. Pt will be able to perform all activities without R knee pain in order to maximize independence  2. Pt will restore WFL R knee ROM in order to perform all functional activities  3. Pt will restore WFL R knee strength in order to return to hobbies  4. Pt will be able to ascend/descend 1 flight of stairs with reciprocal gait pattern in order to return PLOF  5. Pt will be able to ambulate on uneven surfaces with least restrictive AD in order to ambulate in the community.  6. Pt to meet FOTO discharge score in order to improve QOL and maximize independence      Plan  Patient would benefit from: skilled physical therapy  Planned modality interventions: thermotherapy: hydrocollator packs and cryotherapy  Planned therapy interventions: joint mobilization, manual therapy, neuromuscular re-education, patient education, strengthening, stretching, therapeutic exercise, home exercise program and balance  Frequency: 2-3x/week.  Duration in weeks: 6  Treatment plan discussed with: patient      Subjective Evaluation    History of Present Illness  Date of surgery: 1/17/2024  Mechanism of injury: Patient has been  dealing with chronic bilateral knee pain. She had consult in August for elective TKR. Has had many years of injections in both knees, the R knee with no relief and minimal relief in L knee. Currently taking Tramadol for pain management with good relief. Since being home she is walking household distances and is compliant with [facility]  Patient Goals  Patient goals for therapy: independence with ADLs/IADLs, increased strength, increased motion, improved balance, decreased pain, decreased edema and return to sport/leisure activities    Pain  Current pain rating: 3  At best pain rating: 3  At worst pain ratin  Location: Distal lateral thigh  Quality: tight  Aggravating factors: standing, walking and stair climbing    Treatments  Previous treatment: injection treatment  Current treatment: medication and physical therapy        Objective     Observations   Left Knee   Positive for edema, effusion and incision. Negative for drainage.     Tenderness   Left Knee   Tenderness in the lateral joint line and quadriceps tendon. No tenderness in the medial joint line.     Neurological Testing     Sensation     Knee   Left Knee   Intact: Light touch    Right Knee   Intact: light touch     Active Range of Motion   Left Knee   Flexion: 34 degrees   Extension: 8 degrees     Passive Range of Motion   Left Knee   Flexion: 75 degrees   Extension: 3 degrees     Strength/Myotome Testing     Left Knee   Flexion: 3+  Extension: 3-  Quadriceps contraction: fair    Right Knee   Quadriceps contraction: good    Swelling     Left Knee Girth Measurement (cm)   Joint line: 39.5 cm    Right Knee Girth Measurement (cm)   Joint line: 42 cm             Precautions: R TKR, HTN  POC Expiration: 24  Manuals        PROM        Patellar Mobility        Scar STM        Bilateral HS, hip ABD, piriformis, gastroc, and quad with manual hip traction                  Neuro Re-Ed        Quad set        QS c SLR        GS c bridge        STS                         TherEx        NuStep to improve ROM/cardiovasc endurance        SAQ/LAQ        SANTI caceres        Step up/down/lateral        Sidestepping with squat        Monster walk        Leg press DLSANTI                                                        Instructed HEP & education 10'                       Gait Training        Ambulation with AD                        Modalities

## 2024-01-25 ENCOUNTER — OFFICE VISIT (OUTPATIENT)
Dept: PHYSICAL THERAPY | Facility: CLINIC | Age: 74
End: 2024-01-25
Payer: COMMERCIAL

## 2024-01-25 DIAGNOSIS — M25.561 ACUTE PAIN OF RIGHT KNEE: ICD-10-CM

## 2024-01-25 DIAGNOSIS — Z96.651 S/P TKR (TOTAL KNEE REPLACEMENT), RIGHT: Primary | ICD-10-CM

## 2024-01-25 PROCEDURE — 97110 THERAPEUTIC EXERCISES: CPT

## 2024-01-25 PROCEDURE — 97112 NEUROMUSCULAR REEDUCATION: CPT

## 2024-01-25 PROCEDURE — 97140 MANUAL THERAPY 1/> REGIONS: CPT

## 2024-01-25 NOTE — PROGRESS NOTES
"Daily Note     Today's date: 2024  Patient name: Afsaneh Multani  : 1950  MRN: [id number]  Referring provider: Adria Morales MD  Dx:   Encounter Diagnosis     ICD-10-CM    1. S/P TKR (total knee replacement), right  Z96.651       2. Acute pain of right knee  M25.561                      Subjective: Patient reports to PT ready and enthusiastic to begin her program today.       Objective: See treatment diary below      Assessment: Tolerated treatment well. Patient exhibited good technique with therapeutic exercises and would benefit from continued PT to increase R knee ROM/strength and endurance to improve mobility and gait.  Patient demonstrated a good tolerance of her therapeutic ex program with no c/o increased symptoms during/post txs today.      Plan: Continue per plan of care.      Precautions: R TKR, HTN  POC Expiration: 24  Manuals       PROM  5'      Patellar Mobility  5'      Scar STM        Bilateral HS, hip ABD, piriformis, gastroc, and quad with manual hip traction    5'              Neuro Re-Ed        Quad set  2x10 5\"hold      QS c SLR  10x R LE      GS c bridge  10x      STS  2x5 No UE                      TherEx        NuStep to improve ROM/cardiovasc endurance  10' L1      SAQ/LAQ  SAQ 2x10, LAQ 10x      SL clamshells  15xbilat      Step up/down/lateral  10xea bilat 4\"step      Sidestepping with squat        Monster walk        Leg press DL, SL                                                        Instructed HEP & education 10'                       Gait Training        Ambulation with AD                        Modalities           CP 15' to R knee elevated             "

## 2024-01-29 ENCOUNTER — OFFICE VISIT (OUTPATIENT)
Dept: PHYSICAL THERAPY | Facility: CLINIC | Age: 74
End: 2024-01-29
Payer: COMMERCIAL

## 2024-01-29 DIAGNOSIS — Z96.651 S/P TKR (TOTAL KNEE REPLACEMENT), RIGHT: Primary | ICD-10-CM

## 2024-01-29 DIAGNOSIS — M25.561 ACUTE PAIN OF RIGHT KNEE: ICD-10-CM

## 2024-01-29 PROCEDURE — 97110 THERAPEUTIC EXERCISES: CPT

## 2024-01-29 PROCEDURE — 97112 NEUROMUSCULAR REEDUCATION: CPT

## 2024-01-29 PROCEDURE — 97140 MANUAL THERAPY 1/> REGIONS: CPT

## 2024-01-29 NOTE — PROGRESS NOTES
"Daily Note     Today's date: 2024  Patient name: Afsaneh Multani  : 1950  MRN: [id number]  Referring provider: Adria Morales MD  Dx:   Encounter Diagnosis     ICD-10-CM    1. S/P TKR (total knee replacement), right  Z96.651       2. Acute pain of right knee  M25.561                      Subjective: Pt reports compliance with HEP      Objective: See treatment diary below      Assessment:  Pt tolerated treatment session fairly well. ROM knee flexion ~88* today and instructed in scar STM at home. Quad contraction fairly good, no extensor lag noted with SLR. Pt would benefit from continued OP PT services.      Plan: Continue per plan of care.      Precautions: R TKR, HTN  POC Expiration: 24  Manuals      PROM  5' 5'     Patellar Mobility  5' 5'     Scar STM        Bilateral HS, hip ABD, piriformis, gastroc, and quad with manual hip traction    5' 5'             Neuro Re-Ed        Quad set  2x10 5\"hold NT     QS c SLR  10x R LE 10x RLE     GS c bridge  10x 10x     STS  2x5 No UE 2x5 No UE                     TherEx        NuStep to improve ROM/cardiovasc endurance  10' L1 SRC  revs 10'     SAQ/LAQ  SAQ 2x10, LAQ 10x SAQ 2x10, LAQ 2x10     SL clamshells  15xbilat      Step up/down/lateral  10xea bilat 4\"step 10xea bilat 6\"step     TR/HR   2x10 6\" bilat     Sidestepping with squat        Monster walk        Leg press DL, SL                                                        Instructed HEP & education 10'                       Gait Training        Ambulation with AD                        Modalities           CP 15' to R knee elevated               "

## 2024-02-01 ENCOUNTER — OFFICE VISIT (OUTPATIENT)
Dept: PHYSICAL THERAPY | Facility: CLINIC | Age: 74
End: 2024-02-01
Payer: COMMERCIAL

## 2024-02-01 DIAGNOSIS — M25.561 ACUTE PAIN OF RIGHT KNEE: ICD-10-CM

## 2024-02-01 DIAGNOSIS — Z96.651 S/P TKR (TOTAL KNEE REPLACEMENT), RIGHT: Primary | ICD-10-CM

## 2024-02-01 PROCEDURE — 97112 NEUROMUSCULAR REEDUCATION: CPT

## 2024-02-01 PROCEDURE — 97110 THERAPEUTIC EXERCISES: CPT

## 2024-02-01 PROCEDURE — 97140 MANUAL THERAPY 1/> REGIONS: CPT

## 2024-02-01 NOTE — PROGRESS NOTES
"Daily Note     Today's date: 2024  Patient name: Afsaneh Multani  : 1950  MRN: [id number]  Referring provider: Adria Morales MD  Dx:   Encounter Diagnosis     ICD-10-CM    1. S/P TKR (total knee replacement), right  Z96.651       2. Acute pain of right knee  M25.561                      Subjective: Pt reports no new complaints      Objective: See treatment diary below      Assessment:  Pt tolerated treatment session fairly well. Knee flexion ROM ~94* today and quad contraction good today. No evidence of knee extension lag with SLR today. Overall progressing towards goals. Pt would benefit from continued OP PT services.       Plan: Continue per plan of care.      Precautions: R TKR, HTN  POC Expiration: 24  Manuals     PROM  5' 5' 5'    Patellar Mobility  5' 5' 5'    Scar STM        Bilateral HS, hip ABD, piriformis, gastroc, and quad with manual hip traction    5' 5' 5'            Neuro Re-Ed        Quad set  2x10 5\"hold NT NT    QS c SLR  10x R LE 10x RLE 10x RLE 2#    GS c bridge  10x 10x 2x10    STS  2x5 No UE 2x5 No UE 10x                    TherEx        NuStep to improve ROM/cardiovasc endurance  10' L1 SRC /2 revs 10' SRC full revs 10'    SAQ/LAQ  SAQ 2x10, LAQ 10x SAQ 2x10, LAQ 2x10 SAQ 2x10, LAQ 2x10  2#    SL clamshells  15xbilat      Step up/down/lateral  10xea bilat 4\"step 10xea bilat 6\"step 10xea bilat 6\"step    TR/HR   2x10 6\" bilat 2x10 6\" bilat    Sidestepping with squat        Monster walk        Leg press DL, SL                                                        Instructed HEP & education 10'                       Gait Training        Ambulation with AD                        Modalities           CP 15' to R knee elevated                 "

## 2024-02-02 NOTE — PROGRESS NOTES
"Daily Note     Today's date: 2024  Patient name: Afsaneh Multani  : 1950  MRN: [id number]  Referring provider: Adria Morales MD  Dx:   Encounter Diagnosis     ICD-10-CM    1. S/P TKR (total knee replacement), right  Z96.651       2. Acute pain of right knee  M25.561                      Subjective: Patient reports to PT ready and enthusiastic to begin her program this afternoon.      Objective: See treatment diary below      Assessment: Tolerated treatment well. Patient exhibited good technique with therapeutic exercises and would benefit from continued PT to increase R knee ROM/strength and endurance to improve mobility and gait.  Patient demonstrates a good tolerance to increased R knee flexion during her therapeutic ex program.      Plan: Continue per plan of care.      Precautions: R TKR, HTN  POC Expiration: 24  Manuals    PROM  5' 5' 5' 5'   Patellar Mobility  5' 5' 5' 5'   Scar STM        Bilateral HS, hip ABD, piriformis, gastroc, and quad with manual hip traction    5' 5' 5' 5'           Neuro Re-Ed     Quad set  2x10 5\"hold NT NT    QS c SLR  10x R LE 10x RLE 10x RLE 2# 2# 2x10 R   GS c bridge  10x 10x 2x10 2x10   STS  2x5 No UE 2x5 No UE 10x 10x                   TherEx     NuStep to improve ROM/cardiovasc endurance  10' L1 SRC  revs 10' SRC full revs 10' SRC 10'   SAQ/LAQ  SAQ 2x10, LAQ 10x SAQ 2x10, LAQ 2x10 SAQ 2x10, LAQ 2x10  2# SAQ & LAQ [id number]   SL clamshells  15xbilat      Step up/down/lateral  10xea bilat 4\"step 10xea bilat 6\"step 10xea bilat 6\"step 10xea bilat 8\"step   TR/HR   2x10 6\" bilat 2x10 6\" bilat 2x10 6\"step   Sidestepping with squat        Monster walk        Leg press DL, SL                                                        Instructed HEP & education 10'                       Gait Training     Ambulation with AD                        Modalities        CP 15' to R knee elevated    "

## 2024-02-05 ENCOUNTER — OFFICE VISIT (OUTPATIENT)
Dept: PHYSICAL THERAPY | Facility: CLINIC | Age: 74
End: 2024-02-05
Payer: COMMERCIAL

## 2024-02-05 DIAGNOSIS — Z96.651 S/P TKR (TOTAL KNEE REPLACEMENT), RIGHT: Primary | ICD-10-CM

## 2024-02-05 DIAGNOSIS — M25.561 ACUTE PAIN OF RIGHT KNEE: ICD-10-CM

## 2024-02-05 PROCEDURE — 97140 MANUAL THERAPY 1/> REGIONS: CPT

## 2024-02-05 PROCEDURE — 97112 NEUROMUSCULAR REEDUCATION: CPT

## 2024-02-05 PROCEDURE — 97110 THERAPEUTIC EXERCISES: CPT

## 2024-02-08 ENCOUNTER — OFFICE VISIT (OUTPATIENT)
Dept: PHYSICAL THERAPY | Facility: CLINIC | Age: 74
End: 2024-02-08
Payer: COMMERCIAL

## 2024-02-08 DIAGNOSIS — M25.561 ACUTE PAIN OF RIGHT KNEE: ICD-10-CM

## 2024-02-08 DIAGNOSIS — Z96.651 S/P TKR (TOTAL KNEE REPLACEMENT), RIGHT: Primary | ICD-10-CM

## 2024-02-08 PROCEDURE — 97140 MANUAL THERAPY 1/> REGIONS: CPT

## 2024-02-08 PROCEDURE — 97112 NEUROMUSCULAR REEDUCATION: CPT

## 2024-02-08 PROCEDURE — 97110 THERAPEUTIC EXERCISES: CPT

## 2024-02-08 NOTE — PROGRESS NOTES
"Daily Note     Today's date: 2024  Patient name: Afsaneh Multani  : 1950  MRN: [id number]  Referring provider: Adria Morales MD  Dx:   Encounter Diagnosis     ICD-10-CM    1. S/P TKR (total knee replacement), right  Z96.651       2. Acute pain of right knee  M25.561                      Subjective: Pt reports she is feeling good and is pleased with her progress      Objective: See treatment diary below      Assessment:  Pt tolerated treatment session well. Quad contraction good and progressing to ambulating without AD. Trialed ascending/descending 12 steps to simulate basement setup at home; no evidence of LOB or knee buckling. Progressing program to challenge LE strength and balance. Pt would benefit from continued OP PT services.    Plan: Continue per plan of care.      Precautions: R TKR, HTN  POC Expiration: 24  Manuals    PROM 10'  5' 5' 5'   Patellar Mobility 5'  5' 5' 5'   Scar STM        Bilateral HS, hip ABD, piriformis, gastroc, and quad with manual hip traction    * 5' 5' 5'           Neuro Re-Ed        Quad set BOSU march 2'  NT NT    QS c SLR BOSU SLR 5x bilat ea  10x RLE 10x RLE 2# 2# 2x10 R   GS c bridge   10x 2x10 2x10   STS 2x10 on foam  2x5 No UE 10x 10x                   TherEx        NuStep to improve ROM/cardiovasc endurance SRC 10'  SRC 1/ revs 10' SRC full revs 10' SRC 10'   SAQ/LAQ DC  SAQ 2x10, LAQ 2x10 SAQ 2x10, LAQ 2x10  2# SAQ & LAQ [id number]   SL clamshells        Step up/down/lateral 10xea bilat 8\"step  10xea bilat 6\"step 10xea bilat 6\"step 10xea bilat 8\"step   TR/HR Lunges Fwd 10x bilat  2x10 6\" bilat 2x10 6\" bilat 2x10 6\"step   Sidestepping with squat RTB 4x10'       Monster walk        Leg press DL, SL 55# 2x10, 35# 10x bilat ea                                                       Instructed HEP & education                        Gait Training     2/5   Ambulation with AD                        Modalities      2/5         "

## 2024-02-12 ENCOUNTER — OFFICE VISIT (OUTPATIENT)
Dept: PHYSICAL THERAPY | Facility: CLINIC | Age: 74
End: 2024-02-12
Payer: COMMERCIAL

## 2024-02-12 DIAGNOSIS — M25.561 ACUTE PAIN OF RIGHT KNEE: ICD-10-CM

## 2024-02-12 DIAGNOSIS — Z96.651 S/P TKR (TOTAL KNEE REPLACEMENT), RIGHT: Primary | ICD-10-CM

## 2024-02-12 PROCEDURE — 97140 MANUAL THERAPY 1/> REGIONS: CPT

## 2024-02-12 PROCEDURE — 97110 THERAPEUTIC EXERCISES: CPT

## 2024-02-12 PROCEDURE — 97112 NEUROMUSCULAR REEDUCATION: CPT

## 2024-02-12 NOTE — PROGRESS NOTES
"Daily Note     Today's date: 2024  Patient name: Afsaneh Multani  : 1950  MRN: [id number]  Referring provider: Adria Morales MD  Dx:   Encounter Diagnosis     ICD-10-CM    1. S/P TKR (total knee replacement), right  Z96.651       2. Acute pain of right knee  M25.561                      Subjective: R knee is feeling good.       Objective: See treatment diary below      Assessment: Tolerated treatment well. Quad engagement continues to improve. Heavy reliance on UE support with squats and lunges, less with step management. Incremental gains into knee flexion. EDU on utilizing available knee ROM with toe off, she tends to lead with the hip with stiff RLE. Continued PT would be beneficial to improve function.      '  'Plan: Continue per plan of care.       Precautions: R TKR, HTN  POC Expiration: 24  Manuals    PROM 10' 10' 5' 5' 5'   Patellar Mobility 5' 5' 5' 5' 5'   Scar STM        Bilateral HS, hip ABD, piriformis, gastroc, and quad with manual hip traction    5' 5' 5' 5'           Neuro Re-Ed        Quad set BOSU ' BOSU ' NT NT    QS c SLR BOSU SLR 5x bilat ea BOSU SLR 10x bilat ea 10x RLE 10x RLE 2# 2# 2x10 R   GS c bridge   10x 2x10 2x10   STS 2x10 on foam 2x10 on foam 2x5 No UE 10x 10x                   TherEx        NuStep to improve ROM/cardiovasc endurance SRC 10' SRC 10' SRC 1/2 revs 10' SRC full revs 10' SRC 10'   SAQ/LAQ DC  SAQ 2x10, LAQ 2x10 SAQ 2x10, LAQ 2x10  2# SAQ & LAQ [id number]   SL clamshells        Step up/down/lateral 10xea bilat 8\"step 10xea bilat 8\"step 10xea bilat 6\"step 10xea bilat 6\"step 10xea bilat 8\"step   TR/HR Lunges Fwd 10x bilat Lunges Fwd 10x bilat 2x10 6\" bilat 2x10 6\" bilat 2x10 6\"step   Sidestepping with squat RTB 4x10' RTB 6x10'      Monster walk        Leg press DL, SL 55# 2x10, 35# 10x stephaneat ea 55# 2x10, 35# 10x stephaneat ea                                                      Instructed HEP & education          "               Gait Training     2/5   Ambulation with AD                        Modalities      2/5

## 2024-02-14 ENCOUNTER — OFFICE VISIT (OUTPATIENT)
Dept: PHYSICAL THERAPY | Facility: CLINIC | Age: 74
End: 2024-02-14
Payer: COMMERCIAL

## 2024-02-14 DIAGNOSIS — M25.561 ACUTE PAIN OF RIGHT KNEE: ICD-10-CM

## 2024-02-14 DIAGNOSIS — Z96.651 S/P TKR (TOTAL KNEE REPLACEMENT), RIGHT: Primary | ICD-10-CM

## 2024-02-14 PROCEDURE — 97110 THERAPEUTIC EXERCISES: CPT

## 2024-02-14 PROCEDURE — 97140 MANUAL THERAPY 1/> REGIONS: CPT

## 2024-02-14 PROCEDURE — 97112 NEUROMUSCULAR REEDUCATION: CPT

## 2024-02-14 NOTE — PROGRESS NOTES
"Daily Note     Today's date: 2024  Patient name: Afsaneh Multani  : 1950  MRN: [id number]  Referring provider: Adria Morales MD  Dx:   Encounter Diagnosis     ICD-10-CM    1. S/P TKR (total knee replacement), right  Z96.651       2. Acute pain of right knee  M25.561                      Subjective: Patient reports that she is doing well and tolerating her PT well.      Objective: See treatment diary below      Assessment: Tolerated treatment well. Patient exhibited good technique with therapeutic exercises and would benefit from continued PT to increase R knee ROM/strength and endurance to improve mobility and gait.      Plan: Continue per plan of care.      Precautions: R TKR, HTN  POC Expiration: 24  Manuals    PROM 10' 10' 5'  5'   Patellar Mobility 5' 5' 5'  5'   Scar STM        Bilateral HS, hip ABD, piriformis, gastroc, and quad with manual hip traction    5' 5'  5'           Neuro Re-Ed      BOSU March BOSU march 2' BOSU march 2' BOSU March 2'     QS c SLR BOSU SLR 5x bilat ea BOSU SLR 10x bilat ea BOSU SLR 10xea bilat  2# 2x10 R   GS c bridge     2x10   STS 2x10 on foam 2x10 on foam 2x10 on foam  10x                   TherEx      NuStep to improve ROM/cardiovasc endurance SRC 10' SRC 10' SRC 10'L3  SRC 10'   SAQ/LAQ DC    SAQ & LAQ [id number]   SL clamshells        Step up/down/lateral 10xea bilat 8\"step 10xea bilat 8\"step 10xea bilat 8\"step  10xea bilat 8\"step   TR/HR Lunges Fwd 10x bilat Lunges Fwd 10x bilat Lunges Fwd,Lat 10xea bilat  2x10 6\"step   Sidestepping with squat RTB 4x10' RTB 6x10' RTB 6x10'     Monster walk        Leg press DL, SL 55# 2x10, 35# 10x bilat ea 55# 2x10, 35# 10x bilat ea 55#DL  35#SL  2x10ea                                                     Instructed HEP & education                        Gait Training      Ambulation with AD                        Modalities                                  "
PAST SURGICAL HISTORY:  History of tonsillectomy

## 2024-02-19 ENCOUNTER — EVALUATION (OUTPATIENT)
Dept: PHYSICAL THERAPY | Facility: CLINIC | Age: 74
End: 2024-02-19
Payer: COMMERCIAL

## 2024-02-19 DIAGNOSIS — Z96.651 S/P TKR (TOTAL KNEE REPLACEMENT), RIGHT: Primary | ICD-10-CM

## 2024-02-19 DIAGNOSIS — M25.561 ACUTE PAIN OF RIGHT KNEE: ICD-10-CM

## 2024-02-19 PROCEDURE — 97110 THERAPEUTIC EXERCISES: CPT

## 2024-02-19 PROCEDURE — 97140 MANUAL THERAPY 1/> REGIONS: CPT

## 2024-02-19 PROCEDURE — 97535 SELF CARE MNGMENT TRAINING: CPT

## 2024-02-19 NOTE — LETTER
2024    Adria Morales MD  [address] [place] [address]    Patient: Afsaneh Multani   YOB: 1950   Date of Visit: 2024     Encounter Diagnosis     ICD-10-CM    1. S/P TKR (total knee replacement), right  Z96.651       2. Acute pain of right knee  M25.561           Dear Dr. Morales:    Thank you for your recent referral of Afsaneh Multani. Please review the attached evaluation summary from Afsaneh's recent visit.     Please verify that you agree with the plan of care by signing the attached order.     If you have any questions or concerns, please do not hesitate to call.     I sincerely appreciate the opportunity to share in the care of one of your patients and hope to have another opportunity to work with you in the near future.       Sincerely,    Jena Roberts, PT      Referring Provider:      I certify that I have read the below Plan of Care and certify the need for these services furnished under this plan of treatment while under my care.                    Adria Morales MD  [address] [place] [id number]  Via Fax: [phone]          PT Re-Evaluation  and PT Discharge    Today's date: 2024  Patient name: Afsaneh Multani  : 1950  MRN: [id number]  Referring provider: Adria Morales MD  Dx:   Encounter Diagnosis     ICD-10-CM    1. S/P TKR (total knee replacement), right  Z96.651       2. Acute pain of right knee  M25.561                      Assessment  Assessment details: Pt has attended a total of 9 PT sessions and has made adequate progress towards goals to be d/c from PT services. She has improved in knee ROM, pain levels, quad strength and progressed to ambulating without AD. PT reviewed and instructed HEP (handout provided) along with answering pt questions regarding current functional status. Pt has no concerns at time of discharge. Thank you!          Goals  STG (to be met within 4  weeks):  1. Pt will improve R knee pain to no more than 4/10 at worst in order to improve gait quality   met  2. Pt will improve R knee ROM by at least 15 * in order to normalize gait pattern  met  3. Pt will improve quadricep contraction to good in order to improve stability in SL stance  met  4. Pt will improve R knee strength by at least 1/2 grade in order to negotiate curb step  met  5. Pt will ambulate without AD in order to maximize independence  met  6. Pt to improve FOTO score by at least 10 points in order to progress to PLOF  met      LTG (to bet met in 10 weeks):  1. Pt will be able to perform all activities without R knee pain in order to maximize independence  met  2. Pt will restore WFL R knee ROM in order to perform all functional activities  met  3. Pt will restore WFL R knee strength in order to return to hobbies  met  4. Pt will be able to ascend/descend 1 flight of stairs with reciprocal gait pattern in order to return PLOF  met  5. Pt will be able to ambulate on uneven surfaces with least restrictive AD in order to ambulate in the community.  met  6. Pt to meet FOTO discharge score in order to improve QOL and maximize independence   progressing        Subjective Evaluation    History of Present Illness  Date of surgery: 2024  Mechanism of injury: Patient reports significant improvements with her knee. She only takes her pain meds 1x/day and referring surgeon was very pleased with her progress. Would like to transition to HEP  Pain  Current pain ratin  At best pain ratin  At worst pain ratin  Location: Distal thigh  Quality: dull ache          Objective     Observations   Left Knee   Positive for incision. Negative for drainage, edema and effusion.     Additional Observation Details  Well healed closed incision    Tenderness   Left Knee   No tenderness in the lateral joint line, medial joint line and quadriceps tendon.     Neurological Testing     Sensation     Knee   Left Knee  "  Intact: Light touch    Right Knee   Intact: light touch     Active Range of Motion   Left Knee   Flexion: 112 degrees   Extension: 3 degrees     Passive Range of Motion   Left Knee   Flexion: 115 degrees   Extension: 0 degrees     Strength/Myotome Testing     Left Knee   Flexion: 4  Extension: 4  Quadriceps contraction: good    Right Knee   Quadriceps contraction: good             Precautions: R TKR, HTN  POC Expiration: 3/19/24    Manuals 2/8 2/12 2/14 2/19 2/5   PROM 10' 10' 5' 10' 5'   Patellar Mobility 5' 5' 5'  5'   Scar STM        Bilateral HS, hip ABD, piriformis, gastroc, and quad with manual hip traction    5' 5'  5'           Neuro Re-Ed   2/14 2/5   BOSU March BOSU march 2' BOSU march 2' BOSU March 2'     QS c SLR BOSU SLR 5x bilat ea BOSU SLR 10x bilat ea BOSU SLR 10xea bilat  2# 2x10 R   GS c bridge     2x10   STS 2x10 on foam 2x10 on foam 2x10 on foam  10x                   TherEx   2/14 2/5   NuStep to improve ROM/cardiovasc endurance SRC 10' SRC 10' SRC 10'L3 SRC 10'L3 SRC 10'   SAQ/LAQ DC   SLR x3 2.5# 2x10 ea SAQ & LAQ [id number]   SL clamshells    BluTB 2x10 bilat    Step up/down/lateral 10xea bilat 8\"step 10xea bilat 8\"step 10xea bilat 8\"step  10xea bilat 8\"step   TR/HR Lunges Fwd 10x bilat Lunges Fwd 10x bilat Lunges Fwd,Lat 10xea bilat Lunges Fwd,Lat 10xea bilat 2x10 6\"step   Sidestepping with squat RTB 4x10' RTB 6x10' RTB 6x10'     Monster walk        Leg press DL, SL 55# 2x10, 35# 10x bilat ea 55# 2x10, 35# 10x bilat ea 55#DL  35#SL  2x10ea                                                     Instructed HEP & education    15'                    Gait Training   2/14 2/5   Ambulation with AD                        Modalities    2/14 2/5                                    "

## 2024-02-19 NOTE — LETTER
2024    Adria Morales MD  [address] [place] [address]    Patient: Afsaneh Multani   YOB: 1950   Date of Visit: 2024     Encounter Diagnosis     ICD-10-CM    1. S/P TKR (total knee replacement), right  Z96.651       2. Acute pain of right knee  M25.561           Dear Dr. Morales:    Thank you for your recent referral of Afsaneh Multani. Please review the attached evaluation summary from Afsaneh's recent visit.     Please verify that you agree with the plan of care by signing the attached order.     If you have any questions or concerns, please do not hesitate to call.     I sincerely appreciate the opportunity to share in the care of one of your patients and hope to have another opportunity to work with you in the near future.       Sincerely,    Jena Roberts, PT      Referring Provider:      I certify that I have read the below Plan of Care and certify the need for these services furnished under this plan of treatment while under my care.                    Adria Morales MD  [address] [place] [id number]  Via Fax: [phone]          PT Evaluation     Today's date: 2024  Patient name: Afsaneh Multani  : 1950  MRN: [id number]  Referring provider: Adria Morales MD  Dx:   Encounter Diagnosis     ICD-10-CM    1. S/P TKR (total knee replacement), right  Z96.651       2. Acute pain of right knee  M25.561                      Assessment  Assessment details: Pt has attended a total of 9 PT sessions and has is making measurable progress towards goals. She has made progress regarding decreasing pain levels, improved range knee ROM, . Pt continues to have deficits including *** along with having difficulty performing ***. Pt would benefit from continued OP PT services in order to address remaining deficits and limitations. Thank you!        Impairments: abnormal gait, abnormal or restricted ROM,  activity intolerance, impaired balance, impaired physical strength, lacks appropriate home exercise program and pain with function    Goals  STG (to be met within 4 weeks):  1. Pt will improve R knee pain to no more than 4/10 at worst in order to improve gait quality  2. Pt will improve R knee ROM by at least 15 * in order to normalize gait pattern  3. Pt will improve quadricep contraction to good in order to improve stability in SL stance  4. Pt will improve R knee strength by at least 1/2 grade in order to negotiate curb step  5. Pt will ambulate without AD in order to maximize independence  6. Pt to improve FOTO score by at least 10 points in order to progress to PLOF    LTG (to bet met in 10 weeks):  1. Pt will be able to perform all activities without R knee pain in order to maximize independence  2. Pt will restore WFL R knee ROM in order to perform all functional activities  3. Pt will restore WFL R knee strength in order to return to hobbies  4. Pt will be able to ascend/descend 1 flight of stairs with reciprocal gait pattern in order to return PLOF  5. Pt will be able to ambulate on uneven surfaces with least restrictive AD in order to ambulate in the community.  6. Pt to meet FOTO discharge score in order to improve QOL and maximize independence      Plan  Patient would benefit from: skilled physical therapy  Planned modality interventions: thermotherapy: hydrocollator packs and cryotherapy  Planned therapy interventions: joint mobilization, manual therapy, neuromuscular re-education, patient education, strengthening, stretching, therapeutic exercise, home exercise program and balance  Frequency: 2-3x/week.  Duration in weeks: 6  Treatment plan discussed with: patient    Subjective Evaluation    History of Present Illness  Date of surgery: 1/17/2024  Mechanism of injury: Patient has been dealing with chronic bilateral knee pain. She had consult in August for elective TKR. Has had many years of injections  in both knees, the R knee with no relief and minimal relief in L knee. Currently taking Tramadol for pain management with good relief. Since being home she is walking household distances and is compliant with [facility]  Patient Goals  Patient goals for therapy: independence with ADLs/IADLs, increased strength, increased motion, improved balance, decreased pain, decreased edema and return to sport/leisure activities    Pain  Current pain rating: 3  At best pain rating: 3  At worst pain ratin  Location: Distal lateral thigh  Quality: tight  Aggravating factors: standing, walking and stair climbing    Treatments  Previous treatment: injection treatment  Current treatment: medication and physical therapy      Objective     Observations   Left Knee   Positive for edema, effusion and incision. Negative for drainage.     Tenderness   Left Knee   Tenderness in the lateral joint line and quadriceps tendon. No tenderness in the medial joint line.     Neurological Testing     Sensation     Knee   Left Knee   Intact: Light touch    Right Knee   Intact: light touch     Active Range of Motion   Left Knee   Flexion: 34 degrees   Extension: 8 degrees     Passive Range of Motion   Left Knee   Flexion: 75 degrees   Extension: 3 degrees     Strength/Myotome Testing     Left Knee   Flexion: 3+  Extension: 3-  Quadriceps contraction: fair    Right Knee   Quadriceps contraction: good    Swelling     Left Knee Girth Measurement (cm)   Joint line: 39.5 cm    Right Knee Girth Measurement (cm)   Joint line: 42 cm             Precautions: R TKR, HTN  POC Expiration: 24  Manuals        PROM        Patellar Mobility        Scar STM        Bilateral HS, hip ABD, piriformis, gastroc, and quad with manual hip traction                  Neuro Re-Ed        Quad set        QS c SLR        GS c bridge        STS                        TherEx        NuStep to improve ROM/cardiovasc endurance        SAQ/LAQ        SL morris        Step  up/down/lateral        Sidestepping with squat        Monster walk        Leg press DL, SANTI                                                        Instructed HEP & education 10'                       Gait Training        Ambulation with AD                        Modalities

## 2024-02-19 NOTE — PROGRESS NOTES
PT Re-Evaluation  and PT Discharge    Today's date: 2024  Patient name: Afsaneh Multani  : 1950  MRN: [id number]  Referring provider: Adria Morales MD  Dx:   Encounter Diagnosis     ICD-10-CM    1. S/P TKR (total knee replacement), right  Z96.651       2. Acute pain of right knee  M25.561                      Assessment  Assessment details: Pt has attended a total of 9 PT sessions and has made adequate progress towards goals to be d/c from PT services. She has improved in knee ROM, pain levels, quad strength and progressed to ambulating without AD. PT reviewed and instructed HEP (handout provided) along with answering pt questions regarding current functional status. Pt has no concerns at time of discharge. Thank you!          Goals  STG (to be met within 4 weeks):  1. Pt will improve R knee pain to no more than 4/10 at worst in order to improve gait quality   met  2. Pt will improve R knee ROM by at least 15 * in order to normalize gait pattern  met  3. Pt will improve quadricep contraction to good in order to improve stability in SL stance  met  4. Pt will improve R knee strength by at least 1/2 grade in order to negotiate curb step  met  5. Pt will ambulate without AD in order to maximize independence  met  6. Pt to improve FOTO score by at least 10 points in order to progress to PLOF  met      LTG (to bet met in 10 weeks):  1. Pt will be able to perform all activities without R knee pain in order to maximize independence  met  2. Pt will restore WFL R knee ROM in order to perform all functional activities  met  3. Pt will restore WFL R knee strength in order to return to hobbies  met  4. Pt will be able to ascend/descend 1 flight of stairs with reciprocal gait pattern in order to return PLOF  met  5. Pt will be able to ambulate on uneven surfaces with least restrictive AD in order to ambulate in the community.  met  6. Pt to meet FOTO discharge score in order to improve QOL and maximize  "independence   progressing        Subjective Evaluation    History of Present Illness  Date of surgery: 2024  Mechanism of injury: Patient reports significant improvements with her knee. She only takes her pain meds 1x/day and referring surgeon was very pleased with her progress. Would like to transition to HEP  Pain  Current pain ratin  At best pain ratin  At worst pain ratin  Location: Distal thigh  Quality: dull ache          Objective     Observations   Left Knee   Positive for incision. Negative for drainage, edema and effusion.     Additional Observation Details  Well healed closed incision    Tenderness   Left Knee   No tenderness in the lateral joint line, medial joint line and quadriceps tendon.     Neurological Testing     Sensation     Knee   Left Knee   Intact: Light touch    Right Knee   Intact: light touch     Active Range of Motion   Left Knee   Flexion: 112 degrees   Extension: 3 degrees     Passive Range of Motion   Left Knee   Flexion: 115 degrees   Extension: 0 degrees     Strength/Myotome Testing     Left Knee   Flexion: 4  Extension: 4  Quadriceps contraction: good    Right Knee   Quadriceps contraction: good             Precautions: R TKR, HTN  POC Expiration: 3/19/24    Manuals    PROM 10' 10' 5' 10' 5'   Patellar Mobility 5' 5' 5'  5'   Scar STM        Bilateral HS, hip ABD, piriformis, gastroc, and quad with manual hip traction    5' 5'  5'           Neuro Re-Ed      BOSU March BOSU march 2' BOSU march 2' BOSU March 2'     QS c SLR BOSU SLR 5x bilat ea BOSU SLR 10x bilat ea BOSU SLR 10xea bilat  2# 2x10 R   GS c bridge     2x10   STS 2x10 on foam 2x10 on foam 2x10 on foam  10x                   TherEx      NuStep to improve ROM/cardiovasc endurance SRC 10' SRC 10' SRC 10'L3 SRC 10'L3 SRC 10'   SAQ/LAQ DC   SLR x3 2.5# 2x10 ea SAQ & LAQ [id number]   SL clamshells    BluTB 2x10 bilat    Step up/down/lateral 10xea bilat 8\"step 10xea " "bilat 8\"step 10xea bilat 8\"step  10xea bilat 8\"step   TR/HR Lunges Fwd 10x bilat Lunges Fwd 10x bilat Lunges Fwd,Lat 10xea bilat Lunges Fwd,Lat 10xea bilat 2x10 6\"step   Sidestepping with squat RTB 4x10' RTB 6x10' RTB 6x10'     Monster walk        Leg press DL, SL 55# 2x10, 35# 10x bilat ea 55# 2x10, 35# 10x bilat ea 55#DL  35#SL  2x10ea                                                     Instructed HEP & education    15'                    Gait Training   2/14 2/5   Ambulation with AD                        Modalities    2/14 2/5                    "

## 2024-02-22 ENCOUNTER — APPOINTMENT (OUTPATIENT)
Dept: PHYSICAL THERAPY | Facility: CLINIC | Age: 74
End: 2024-02-22
Payer: COMMERCIAL

## 2024-02-26 ENCOUNTER — APPOINTMENT (OUTPATIENT)
Dept: PHYSICAL THERAPY | Facility: CLINIC | Age: 74
End: 2024-02-26
Payer: COMMERCIAL

## 2024-02-29 ENCOUNTER — APPOINTMENT (OUTPATIENT)
Dept: PHYSICAL THERAPY | Facility: CLINIC | Age: 74
End: 2024-02-29
Payer: COMMERCIAL

## 2024-06-14 ENCOUNTER — HOSPITAL ENCOUNTER (OUTPATIENT)
Dept: RADIOLOGY | Facility: CLINIC | Age: 74
End: 2024-06-14
Payer: COMMERCIAL

## 2024-06-14 VITALS — BODY MASS INDEX: 29.06 KG/M2 | HEIGHT: 63 IN | WEIGHT: 164 LBS

## 2024-06-14 DIAGNOSIS — Z12.31 ENCOUNTER FOR SCREENING MAMMOGRAM FOR MALIGNANT NEOPLASM OF BREAST: ICD-10-CM

## 2024-06-14 PROCEDURE — 77063 BREAST TOMOSYNTHESIS BI: CPT

## 2024-06-14 PROCEDURE — 77067 SCR MAMMO BI INCL CAD: CPT

## 2024-07-11 ENCOUNTER — HOSPITAL ENCOUNTER (OUTPATIENT)
Dept: NON INVASIVE DIAGNOSTICS | Facility: HOSPITAL | Age: 74
Discharge: HOME | End: 2024-07-11
Payer: COMMERCIAL

## 2024-07-11 VITALS
DIASTOLIC BLOOD PRESSURE: 66 MMHG | HEIGHT: 63 IN | HEART RATE: 73 BPM | BODY MASS INDEX: 29.06 KG/M2 | SYSTOLIC BLOOD PRESSURE: 115 MMHG | WEIGHT: 164 LBS

## 2024-07-11 DIAGNOSIS — I35.0 NONRHEUMATIC AORTIC (VALVE) STENOSIS: ICD-10-CM

## 2024-07-11 LAB
AORTIC ROOT: 2.9 CM
AORTIC VALVE MEAN VELOCITY: 21.7 M/S
APICAL FOUR CHAMBER EJECTION FRACTION: 63 %
ASCENDING AORTA: 2.6 CM
AV AREA BY CONTINUOUS VTI: 1.1 CM2
AV AREA PEAK VELOCITY: 1.1 CM2
AV LVOT MEAN GRADIENT: 2 MMHG
AV LVOT PEAK GRADIENT: 4 MMHG
AV MEAN GRADIENT: 21 MMHG
AV PEAK GRADIENT: 37 MMHG
AV VALVE AREA: 1.06 CM2
AV VELOCITY RATIO: 0.34
BSA FOR ECHO PROCEDURE: 1.78 M2
DOP CALC AO PEAK VEL: 3.06 M/S
DOP CALC AO VTI: 63.46 CM
DOP CALC LVOT AREA: 3.14 CM2
DOP CALC LVOT CARDIAC INDEX: 2.92 L/MIN/M2
DOP CALC LVOT CARDIAC OUTPUT: 5.2 L/MIN
DOP CALC LVOT DIAMETER: 2 CM
DOP CALC LVOT PEAK VEL VTI: 21.46 CM
DOP CALC LVOT PEAK VEL: 1.05 M/S
DOP CALC LVOT STROKE INDEX: 37.1 ML/M2
DOP CALC LVOT STROKE VOLUME: 67.38
E WAVE DECELERATION TIME: 206 MS
E/A RATIO: 0.87
FRACTIONAL SHORTENING: 38 (ref 28–44)
INTERVENTRICULAR SEPTUM IN DIASTOLE (PARASTERNAL SHORT AXIS VIEW): 1.3 CM
INTERVENTRICULAR SEPTUM: 1.3 CM (ref 0.6–1.1)
LAAS-AP2: 17.4 CM2
LAAS-AP4: 19 CM2
LEFT ATRIUM SIZE: 3.4 CM
LEFT ATRIUM VOLUME (MOD BIPLANE): 58 ML
LEFT ATRIUM VOLUME INDEX (MOD BIPLANE): 32.6 ML/M2
LEFT INTERNAL DIMENSION IN SYSTOLE: 2.4 CM (ref 2.1–4)
LEFT VENTRICULAR INTERNAL DIMENSION IN DIASTOLE: 3.9 CM (ref 3.5–6)
LEFT VENTRICULAR POSTERIOR WALL IN END DIASTOLE: 1 CM
LEFT VENTRICULAR STROKE VOLUME: 47 ML
LVSV (TEICH): 47 ML
MV E'TISSUE VEL-SEP: 8 CM/S
MV PEAK A VEL: 1.07 M/S
MV PEAK E VEL: 93 CM/S
MV STENOSIS PRESSURE HALF TIME: 60 MS
MV VALVE AREA P 1/2 METHOD: 3.67
RIGHT ATRIUM AREA SYSTOLE A4C: 14.6 CM2
RIGHT VENTRICLE ID DIMENSION: 3 CM
SINOTUBULAR JUNCTION: 1.9 CM
SL CV LEFT ATRIUM LENGTH A2C: 4.5 CM
SL CV PED ECHO LEFT VENTRICLE DIASTOLIC VOLUME (MOD BIPLANE) 2D: 67 ML
SL CV PED ECHO LEFT VENTRICLE SYSTOLIC VOLUME (MOD BIPLANE) 2D: 20 ML
SL CV SINUS OF VALSALVA 2D: 2.1 CM
STJ: 1.9 CM
TR MAX PG: 24 MMHG
TR PEAK VELOCITY: 2.5 M/S
TRICUSPID ANNULAR PLANE SYSTOLIC EXCURSION: 2.1 CM
TRICUSPID VALVE PEAK REGURGITATION VELOCITY: 2.45 M/S

## 2024-07-11 PROCEDURE — 93306 TTE W/DOPPLER COMPLETE: CPT

## 2024-07-11 PROCEDURE — 93306 TTE W/DOPPLER COMPLETE: CPT | Performed by: INTERNAL MEDICINE

## 2024-12-06 ENCOUNTER — OFFICE VISIT (OUTPATIENT)
Dept: URGENT CARE | Facility: CLINIC | Age: 74
End: 2024-12-06
Payer: COMMERCIAL

## 2024-12-06 VITALS
BODY MASS INDEX: 29.06 KG/M2 | HEART RATE: 92 BPM | SYSTOLIC BLOOD PRESSURE: 140 MMHG | OXYGEN SATURATION: 97 % | WEIGHT: 164 LBS | HEIGHT: 63 IN | RESPIRATION RATE: 18 BRPM | TEMPERATURE: 98.4 F | DIASTOLIC BLOOD PRESSURE: 80 MMHG

## 2024-12-06 DIAGNOSIS — S16.1XXA STRAIN OF MUSCLE, FASCIA AND TENDON AT NECK LEVEL, INITIAL ENCOUNTER: ICD-10-CM

## 2024-12-06 DIAGNOSIS — M54.2 NECK PAIN: Primary | ICD-10-CM

## 2024-12-06 PROCEDURE — 99213 OFFICE O/P EST LOW 20 MIN: CPT | Performed by: EMERGENCY MEDICINE

## 2024-12-06 PROCEDURE — S9088 SERVICES PROVIDED IN URGENT: HCPCS | Performed by: EMERGENCY MEDICINE

## 2024-12-06 RX ORDER — PRENATAL 168/IRON/FOLIC/OMEGA3 27-800-235
CAPSULE ORAL
COMMUNITY

## 2024-12-06 RX ORDER — PREDNISONE 10 MG/1
10 TABLET ORAL 3 TIMES DAILY
Qty: 21 TABLET | Refills: 0 | Status: SHIPPED | OUTPATIENT
Start: 2024-12-06 | End: 2024-12-13

## 2024-12-06 RX ORDER — FLUTICASONE PROPIONATE 50 MCG
SPRAY, SUSPENSION (ML) NASAL
COMMUNITY
Start: 2024-10-23

## 2024-12-06 NOTE — PROGRESS NOTES
[facility]        NAME: Afsaneh Multani is a 74 y.o. female  : 1950    MRN: [id number]  DATE: 2024  TIME: 12:02 PM    Assessment and Plan   Neck pain [M54.2]  1. Neck pain  predniSONE 10 mg tablet      2. Strain of muscle, fascia and tendon at neck level, initial encounter          Symptoms seem consistent with suboccipital muscle strain/sprain.  Most likely cause of dizziness.  Patient has been trying Tylenol without relief.  Will do short course of steroid to help with pain and inflammation.  Advised to do gentle at home stretching and massage as tolerated.  Went over signs and symptoms of when to proceed ER.  Patient verbalized understanding.    Patient Instructions     Take steroid as prescribed   Can take Tylenol as needed for breakthrough pain but do not take NSAIDs such as ibuprofen while taking steroid  Ice or heat as needed  Light stretching as tolerated    Follow up with PCP in 3-5 days.  Proceed to  ER if symptoms worsen.    If tests are performed, our office will contact you with results only if changes need to made to the care plan discussed with you at the visit. You can [facility] full [facility].    Chief Complaint     Chief Complaint   Patient presents with    Neck Pain     Started 1 day ago  Posterior neck pain, and dizziness when laying down   No OTC medication         History of Present Illness       Patient is presenting with posterior neck pain x 1 day ago.  She also endorses dizziness when laying down.  She denies any headache but states that the pain radiates to the top of her head and feels like a dull ache.  She denies any throbbing headache.  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  She denies any injury.  She denies any arm pain or numbness or tingling that goes down to her arm.  She states she takes Tylenol daily.    Neck Pain         Review of Systems   Review of Systems    Constitutional: Negative.    HENT: Negative.     Respiratory: Negative.     Cardiovascular: Negative.    Musculoskeletal:  Positive for neck pain.   Skin: Negative.    Neurological:  Positive for dizziness.         Current Medications       Current Outpatient Medications:     amLODIPine (NORVASC) 5 mg tablet, , Disp: , Rfl:     aspirin (ECOTRIN LOW STRENGTH) 81 mg EC tablet, Take by mouth, Disp: , Rfl:     atorvastatin (LIPITOR) 40 mg tablet, , Disp: , Rfl:     Cholecalciferol (Vitamin D3) 25 MCG (1000 UT) CAPS, , Disp: , Rfl:     co-enzyme Q-10 100 mg capsule, Take by mouth, Disp: , Rfl:     fluticasone (FLONASE) 50 mcg/act nasal spray, , Disp: , Rfl:     levothyroxine 112 mcg tablet, , Disp: , Rfl:     Multiple Vitamin (MULTIVITAMIN PO), , Disp: , Rfl:     predniSONE 10 mg tablet, Take 1 tablet (10 mg total) by mouth 3 (three) times a day for 7 days, Disp: 21 tablet, Rfl: 0    acetaminophen (TYLENOL) 325 mg tablet, Take 2 tablets (650 mg total) by mouth every 6 (six) hours as needed for mild pain (Patient not taking: Reported on 12/6/2024), Disp: , Rfl: 0    Calcium-Phosphorus-Vitamin D (Citracal +D3) 250-107-500 MG-MG-UNIT CHEW, Chew, Disp: , Rfl:     ibandronate (BONIVA) 150 MG tablet, Take 1 tablet by mouth (Patient not taking: Reported on 12/6/2024), Disp: , Rfl:     Current Allergies     Allergies as of 12/06/2024    (No Known Allergies)            The following portions of the patient's history were reviewed and updated as appropriate: allergies, current medications, past family history, past medical history, past social history, past surgical history and problem list.     Past Medical History:   Diagnosis Date    Disease of thyroid gland     High cholesterol     HL (hearing loss)     Hypertension        Past Surgical History:   Procedure Laterality Date    HYSTERECTOMY      age 50    OOPHORECTOMY Bilateral     age 50    US GUIDED BREAST BIOPSY RIGHT COMPLETE Right 03/16/2022    benign       Family History  "  Problem Relation Age of Onset    No Known Problems Mother     No Known Problems Father     No Known Problems Daughter     No Known Problems Maternal Grandmother     No Known Problems Maternal Grandfather     No Known Problems Paternal Grandmother     No Known Problems Paternal Grandfather     No Known Problems Daughter     No Known Problems Maternal Aunt     No Known Problems Paternal Aunt     No Known Problems Paternal Aunt     No Known Problems Paternal Aunt     No Known Problems Paternal Aunt     No Known Problems Paternal Aunt     No Known Problems Paternal Aunt     BRCA2 Positive Neg Hx     BRCA2 Negative Neg Hx     BRCA1 Negative Neg Hx     BRCA1 Positive Neg Hx     BRCA 1/2 Neg Hx     Ovarian cancer Neg Hx     Endometrial cancer Neg Hx     Colon cancer Neg Hx     Breast cancer additional onset Neg Hx     Breast cancer Neg Hx          Medications have been verified.        Objective   /80   Pulse 92   Temp 98.4 °F (36.9 °C)   Resp 18   Ht 5' 3\" (1.6 m)   Wt 74.4 kg (164 lb)   SpO2 97%   BMI 29.05 kg/m²        Physical Exam     Physical Exam  Constitutional:       Appearance: Normal appearance.   Cardiovascular:      Rate and Rhythm: Normal rate.      Pulses: Normal pulses.   Pulmonary:      Effort: Pulmonary effort is normal.   Musculoskeletal:         General: Tenderness (TTP at base of skull on R side of neck) present. No swelling or deformity. Normal range of motion.      Comments: Full neck ROM but endorses dizziness with neck extension   Skin:     General: Skin is warm.   Neurological:      General: No focal deficit present.      Mental Status: She is alert and oriented to person, place, and time. Mental status is at baseline.      Cranial Nerves: No cranial nerve deficit.                   "

## 2024-12-06 NOTE — PATIENT INSTRUCTIONS
Take steroid as prescribed   Can take Tylenol as needed for breakthrough pain but do not take NSAIDs such as ibuprofen while taking steroid  Ice or heat as needed  Light stretching as tolerated    Follow up with PCP in 3-5 days.  Proceed to  ER if symptoms worsen.    If tests are performed, our office will contact you with results only if changes need to made to the care plan discussed with you at the visit. You can review your full results on [facility].

## 2025-06-23 ENCOUNTER — HOSPITAL ENCOUNTER (OUTPATIENT)
Dept: RADIOLOGY | Facility: CLINIC | Age: 75
Discharge: HOME | End: 2025-06-23
Payer: COMMERCIAL

## 2025-06-23 VITALS — HEIGHT: 63 IN | BODY MASS INDEX: 30.48 KG/M2 | WEIGHT: 172 LBS

## 2025-06-23 DIAGNOSIS — Z12.31 ENCOUNTER FOR SCREENING MAMMOGRAM FOR BREAST CANCER: ICD-10-CM

## 2025-06-23 PROCEDURE — 77067 SCR MAMMO BI INCL CAD: CPT

## 2025-06-23 PROCEDURE — 77063 BREAST TOMOSYNTHESIS BI: CPT

## 2025-06-25 DIAGNOSIS — Z12.31 ENCOUNTER FOR SCREENING MAMMOGRAM FOR MALIGNANT NEOPLASM OF BREAST: ICD-10-CM
